# Patient Record
Sex: FEMALE | Race: ASIAN | NOT HISPANIC OR LATINO
[De-identification: names, ages, dates, MRNs, and addresses within clinical notes are randomized per-mention and may not be internally consistent; named-entity substitution may affect disease eponyms.]

---

## 2021-09-23 ENCOUNTER — APPOINTMENT (OUTPATIENT)
Dept: CARDIOLOGY | Facility: CLINIC | Age: 76
End: 2021-09-23
Payer: MEDICARE

## 2021-09-23 VITALS
DIASTOLIC BLOOD PRESSURE: 78 MMHG | RESPIRATION RATE: 18 BRPM | OXYGEN SATURATION: 98 % | SYSTOLIC BLOOD PRESSURE: 164 MMHG | HEIGHT: 62 IN | TEMPERATURE: 97.6 F | HEART RATE: 84 BPM | BODY MASS INDEX: 33.13 KG/M2 | WEIGHT: 180 LBS

## 2021-09-23 DIAGNOSIS — I10 ESSENTIAL (PRIMARY) HYPERTENSION: ICD-10-CM

## 2021-09-23 DIAGNOSIS — R07.9 CHEST PAIN, UNSPECIFIED: ICD-10-CM

## 2021-09-23 DIAGNOSIS — E11.9 TYPE 2 DIABETES MELLITUS W/OUT COMPLICATIONS: ICD-10-CM

## 2021-09-23 DIAGNOSIS — E78.5 HYPERLIPIDEMIA, UNSPECIFIED: ICD-10-CM

## 2021-09-23 PROBLEM — Z00.00 ENCOUNTER FOR PREVENTIVE HEALTH EXAMINATION: Status: ACTIVE | Noted: 2021-09-23

## 2021-09-23 PROCEDURE — 93306 TTE W/DOPPLER COMPLETE: CPT

## 2021-09-23 PROCEDURE — 93000 ELECTROCARDIOGRAM COMPLETE: CPT

## 2021-09-23 PROCEDURE — 99205 OFFICE O/P NEW HI 60 MIN: CPT

## 2021-09-23 RX ORDER — GABAPENTIN 600 MG/1
600 TABLET, COATED ORAL
Refills: 0 | Status: ACTIVE | COMMUNITY
Start: 2021-09-23

## 2021-09-23 RX ORDER — SIMVASTATIN 20 MG/1
20 TABLET, FILM COATED ORAL
Refills: 0 | Status: ACTIVE | COMMUNITY
Start: 2021-09-23

## 2021-09-23 RX ORDER — GLIMEPIRIDE 4 MG/1
4 TABLET ORAL
Refills: 0 | Status: ACTIVE | COMMUNITY
Start: 2021-09-23

## 2021-09-23 RX ORDER — DONEPEZIL HYDROCHLORIDE 5 MG/1
5 TABLET, FILM COATED ORAL
Refills: 0 | Status: ACTIVE | COMMUNITY
Start: 2021-09-23

## 2021-09-23 RX ORDER — ISOSORBIDE MONONITRATE 60 MG/1
60 TABLET, EXTENDED RELEASE ORAL
Refills: 0 | Status: ACTIVE | COMMUNITY
Start: 2021-09-23

## 2021-09-23 RX ORDER — ICOSAPENT ETHYL 1000 MG/1
1 CAPSULE ORAL
Refills: 0 | Status: ACTIVE | COMMUNITY
Start: 2021-09-23

## 2021-09-23 RX ORDER — LOSARTAN POTASSIUM AND HYDROCHLOROTHIAZIDE 12.5; 1 MG/1; MG/1
100-12.5 TABLET ORAL
Refills: 0 | Status: ACTIVE | COMMUNITY
Start: 2021-09-23

## 2021-09-23 RX ORDER — ASPIRIN 81 MG/1
81 TABLET ORAL
Refills: 0 | Status: ACTIVE | COMMUNITY
Start: 2021-09-23

## 2021-09-23 NOTE — ASSESSMENT
[FreeTextEntry1] : 76 F HTN DM hyperlipidemia with CP, SOB and abnormal EKG.\par The patient has symptoms consistent with progressive angina and has new changes on EKG.\par REFER FOR CARDIAC CATHETERIZATION.\par Continue ASA and statin.\par NG 0.4mg prn for CP given. Patient instructed to call 911 if CP occurs again and is not relieved with NG.\par Condition deteriorating, see me after test.

## 2021-09-23 NOTE — REASON FOR VISIT
[Hyperlipidemia] : hyperlipidemia [Hypertension] : hypertension [FreeTextEntry1] : 76 F HTN hyperlipidemia DM with CP and SOB.

## 2021-09-23 NOTE — HISTORY OF PRESENT ILLNESS
[FreeTextEntry1] : 1. HTN: on medications.\par 2. Hyperlipidemia: on statin.\par 3. DM: on glimepiride and levemir.\par 4. The patient has noted severe exertional CP over the last few months. Her symptoms are exertional, progressive, lasting minutes and associated with dizziness.\par Her symptoms are severe and increasing in severity and duration. Her CP is pressure-like lasting minutes and relieved with rest and radiates to her jaw. She also has limited ET due to severe SOB with exertion. Her symptoms occur several times/ day.

## 2021-09-23 NOTE — PHYSICAL EXAM
[Well Developed] : well developed [Well Nourished] : well nourished [No Acute Distress] : no acute distress [Normal Conjunctiva] : normal conjunctiva [Normal Venous Pressure] : normal venous pressure [No Carotid Bruit] : no carotid bruit [Normal S1, S2] : normal S1, S2 [No Rub] : no rub [No Gallop] : no gallop [Clear Lung Fields] : clear lung fields [Good Air Entry] : good air entry [No Respiratory Distress] : no respiratory distress  [Soft] : abdomen soft [No Masses/organomegaly] : no masses/organomegaly [Non Tender] : non-tender [Normal Bowel Sounds] : normal bowel sounds [Normal Gait] : normal gait [No Cyanosis] : no cyanosis [No Varicosities] : no varicosities [No Clubbing] : no clubbing [No Rash] : no rash [No Skin Lesions] : no skin lesions [Moves all extremities] : moves all extremities [No Focal Deficits] : no focal deficits [Normal Speech] : normal speech [Alert and Oriented] : alert and oriented [Normal memory] : normal memory [de-identified] : 2/6 JAMIE MCCAULEY  [de-identified] : 1+ bilateral leg edema

## 2021-09-28 ENCOUNTER — APPOINTMENT (OUTPATIENT)
Dept: DISASTER EMERGENCY | Facility: CLINIC | Age: 76
End: 2021-09-28

## 2021-09-28 DIAGNOSIS — Z01.818 ENCOUNTER FOR OTHER PREPROCEDURAL EXAMINATION: ICD-10-CM

## 2021-09-29 LAB — SARS-COV-2 N GENE NPH QL NAA+PROBE: NOT DETECTED

## 2021-09-29 RX ORDER — CHLORHEXIDINE GLUCONATE 213 G/1000ML
1 SOLUTION TOPICAL ONCE
Refills: 0 | Status: DISCONTINUED | OUTPATIENT
Start: 2021-10-01 | End: 2021-10-02

## 2021-09-29 NOTE — H&P ADULT - NSHPLABSRESULTS_GEN_ALL_CORE
13.2   4.51  )-----------( 185      ( 01 Oct 2021 11:26 )             41.2                             EKG: NSR; TWI V2-V6

## 2021-09-29 NOTE — H&P ADULT - ATTENDING COMMENTS
Please see PA note for full details.  I have reviewed the case, examined the patient and agree with plan.  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  Patient underwent PCI and will f/u with me in clinic.

## 2021-09-29 NOTE — H&P ADULT - HISTORY OF PRESENT ILLNESS
[facility]   COVID: 9/28/21 Negative (HIE)   Cardiologist: Dr Troy  Pharmacy:   Escort:    76 Y F Polish speaking  with pmh HTN, HLD, DM II who presented to her cardiologist c/o ___ progressively worsening chest pressure with radiation to jaw and associated and SOB with exertion of ____ blocks/flights occurring for ____________.   Pt denies______  ECHO 9/23/21: EF 65-70%, mild DD, mild MR.   In light of risk factors, CCS angina class ____ symptoms, pt recommended for cardiac angiogram with possible intervention if clinically indicated.    [facility]   COVID: 9/28/21 Negative (HIE)   Cardiologist: Dr Troy  Pharmacy:   Escort:    76 Y F Luxembourgish speaking  with pmh HTN, HLD, DM II who presented to her cardiologist c/o ___ progressively worsening chest pressure with radiation to jaw and associated and SOB with exertion of ____ blocks/flights occurring for ____________.   Pt denies______  ECHO 9/23/21: EF 65-70%, mild DD, mild MR.   In light of risk factors, CCS angina class ____ symptoms, pt recommended for cardiac angiogram with possible intervention if clinically indicated.    [facility]   COVID: 9/28/21 Negative (HIE)   Cardiologist: Dr Troy  Pharmacy:   Escort:    76 Y F Danish speaking  with pmh HTN, HLD, DM II who presented to her cardiologist c/o ___ progressively worsening chest pressure with radiation to jaw and associated and SOB with exertion of ____ blocks/flights occurring for ____________.   Pt denies______  ECHO 9/23/21: EF 65-70%, mild DD, mild MR.   In light of risk factors, CCS angina class ____ symptoms, pt recommended for cardiac angiogram with possible intervention if clinically indicated.    COVID: 9/28/21 Negative (HIE)   Cardiologist: Dr Troy  Pharmacy: [facility] [address] [place] [address]  Escort: sibling   76 Y F Kyrgyz speaking  POOR HISTORIAN former smoker with pmh colon cancer (s/p chemo/radiation treatment 15 yrs ago), asthma (no hosp/intubation), HTN, HLD, DM II who presented to her cardiologist c/o progressively worsening L sided chest pressure 6/10 with radiation to jaw and associated nausea, dizziness, diaphoresis, fatigue  palpitations, and SOB with exertion of 1 block occurring for 3 months.  Symptoms resolve with rest.  Pt denies orthopnea, PND, LE edema, V/D, fever, chills, sick contact, or recent travel.  ECHO 9/23/21: EF 65-70%, mild diastolic dysfunction, mild MR.  In light of risk factors, CCS angina class III symptoms, pt recommended for cardiac angiogram with possible intervention if clinically indicated.    COVID: 9/28/21 Negative (HIE)   Cardiologist: Dr Troy  Pharmacy: [facility] [address] [place] [address]  Escort: sibling   76 Y F Lao speaking  POOR HISTORIAN former smoker with pmh colon cancer (s/p chemo/radiation treatment 15 yrs ago), asthma (no hosp/intubation), HTN, HLD, DM II who presented to her cardiologist c/o progressively worsening L sided chest pressure 6/10 with radiation to jaw and associated nausea, dizziness, diaphoresis, fatigue  palpitations, and SOB with exertion of 1 block occurring for 3 months.  Symptoms resolve with rest.  Pt denies orthopnea, PND, LE edema, V/D, fever, chills, sick contact, or recent travel.  ECHO 9/23/21: EF 65-70%, mild diastolic dysfunction, mild MR.  In light of risk factors, CCS angina class III symptoms, pt recommended for cardiac angiogram with possible intervention if clinically indicated.    COVID: 9/28/21 Negative (HIE)   Cardiologist: Dr Troy  Pharmacy: [facility] [address] [place] [address]  Escort: sibling   76 Y F Albanian speaking  POOR HISTORIAN former smoker with pmh colon cancer (s/p chemo/radiation treatment 15 yrs ago), asthma (no hosp/intubation), HTN, HLD, DM II who presented to her cardiologist c/o progressively worsening L sided chest pressure 6/10 with radiation to jaw and associated nausea, dizziness, diaphoresis, fatigue  palpitations, and SOB with exertion of 1 block occurring for 3 months.  Symptoms resolve with rest.  Pt denies orthopnea, PND, LE edema, V/D, fever, chills, sick contact, or recent travel.  ECHO 9/23/21: EF 65-70%, mild diastolic dysfunction, mild MR.  In light of risk factors, CCS angina class III symptoms, pt recommended for cardiac angiogram with possible intervention if clinically indicated.    COVID: 9/28/21 Negative (HIE)   Cardiologist: Dr Troy  Pharmacy: [facility] [address] [place] [address]  Escort: sibling   76 Y F Welsh speaking  POOR HISTORIAN former smoker with pmh colon cancer (s/p chemo/radiation treatment 15 yrs ago), asthma (no hosp/intubation), HTN, HLD, DM II who presented to her cardiologist c/o progressively worsening L sided chest pressure 6/10 with radiation to jaw and associated nausea, dizziness, diaphoresis, fatigue  palpitations, and SOB with exertion of 1 block occurring for 3 months.  Symptoms resolve with rest.  Pt denies orthopnea, PND, LE edema, V/D, fever, chills, sick contact, or recent travel.  ECHO 9/23/21: EF 65-70%, mild diastolic dysfunction, mild MR.  In light of risk factors, CCS angina class III symptoms, pt recommended for cardiac angiogram with possible intervention if clinically indicated.    COVID: 9/28/21 Negative (HIE)   Cardiologist: Dr Troy  Pharmacy: [facility] [address] [place] [address]  Escort: sibling   76 Y F Ukrainian speaking  POOR HISTORIAN former smoker with pmh colon cancer (s/p chemo/radiation treatment 15 yrs ago), asthma (no hosp/intubation), HTN, HLD, DM II who presented to her cardiologist c/o progressively worsening L sided chest pressure 6/10 with radiation to jaw and associated nausea, dizziness, diaphoresis, fatigue  palpitations, and SOB with exertion of 1 block occurring for 3 months.  Symptoms resolve with rest.  Pt denies orthopnea, PND, LE edema, V/D, fever, chills, sick contact, or recent travel.  ECHO 9/23/21: EF 65-70%, mild diastolic dysfunction, mild MR.  In light of risk factors, CCS angina class III symptoms, pt recommended for cardiac angiogram with possible intervention if clinically indicated.    COVID: 9/28/21 Negative (HIE)   Cardiologist: Dr Troy  Pharmacy: [facility] [address] [place] [address]  Escort: sibling   76 Y F Turkish speaking  POOR HISTORIAN former smoker with pmh colon cancer (s/p chemo/radiation treatment 15 yrs ago), asthma (no hosp/intubation), HTN, HLD, DM II who presented to her cardiologist c/o progressively worsening L sided chest pressure 6/10 with radiation to jaw and associated nausea, dizziness, diaphoresis, fatigue  palpitations, and SOB with exertion of 1 block occurring for 3 months.  Symptoms resolve with rest.  Pt denies orthopnea, PND, LE edema, V/D, fever, chills, sick contact, or recent travel.  ECHO 9/23/21: EF 65-70%, mild diastolic dysfunction, mild MR.  In light of risk factors, CCS angina class III symptoms, pt recommended for cardiac angiogram with possible intervention if clinically indicated.

## 2021-09-29 NOTE — H&P ADULT - ASSESSMENT
76 Y F Lithuanian speaking  POOR HISTORIAN former smoker with pmh colon cancer (s/p chemo/radiation treatment 15 yrs ago), asthma (no hosp/intubation), HTN, HLD, DM II who presented to her cardiologist c/o progressively worsening L sided chest pressure 6/10 with radiation to jaw and associated nausea, dizziness, diaphoresis, fatigue  palpitations, and SOB with exertion of 1 block occurring for 3 months.  Symptoms resolve with rest.  Pt denies orthopnea, PND, LE edema, V/D, fever, chills, sick contact, or recent travel.  ECHO 9/23/21: EF 65-70%, mild diastolic dysfunction, mild MR.  In light of risk factors, CCS angina class III symptoms, pt recommended for cardiac angiogram with possible intervention if clinically indicated.     --ASA III; Mallampati III.   --VSS.   --Pt is a candidate for moderate sedation.   --LOAD: ASA 325mg PO x1, Plavix 600mg PO x1.   --FLUIDS: NS 75cc/hr x4hrs.     Risks & benefits of procedure and alternative therapy have been explained to the patient including but not limited to: allergic reaction, bleeding w/possible need for blood transfusion, infection, renal and vascular compromise, limb damage, arrhythmia, stroke, vessel dissection/perforation, Myocardial infarction, emergent CABG. Informed consent obtained and in chart.  76 Y F Lao speaking  POOR HISTORIAN former smoker with pmh colon cancer (s/p chemo/radiation treatment 15 yrs ago), asthma (no hosp/intubation), HTN, HLD, DM II who presented to her cardiologist c/o progressively worsening L sided chest pressure 6/10 with radiation to jaw and associated nausea, dizziness, diaphoresis, fatigue  palpitations, and SOB with exertion of 1 block occurring for 3 months.  Symptoms resolve with rest.  Pt denies orthopnea, PND, LE edema, V/D, fever, chills, sick contact, or recent travel.  ECHO 9/23/21: EF 65-70%, mild diastolic dysfunction, mild MR.  In light of risk factors, CCS angina class III symptoms, pt recommended for cardiac angiogram with possible intervention if clinically indicated.     --ASA III; Mallampati III.   --VSS.   --Pt is a candidate for moderate sedation.   --LOAD: ASA 325mg PO x1, Plavix 600mg PO x1.   --FLUIDS: NS 75cc/hr x4hrs.     Risks & benefits of procedure and alternative therapy have been explained to the patient including but not limited to: allergic reaction, bleeding w/possible need for blood transfusion, infection, renal and vascular compromise, limb damage, arrhythmia, stroke, vessel dissection/perforation, Myocardial infarction, emergent CABG. Informed consent obtained and in chart.  76 Y F Occitan speaking  POOR HISTORIAN former smoker with pmh colon cancer (s/p chemo/radiation treatment 15 yrs ago), asthma (no hosp/intubation), HTN, HLD, DM II who presented to her cardiologist c/o progressively worsening L sided chest pressure 6/10 with radiation to jaw and associated nausea, dizziness, diaphoresis, fatigue  palpitations, and SOB with exertion of 1 block occurring for 3 months.  Symptoms resolve with rest.  Pt denies orthopnea, PND, LE edema, V/D, fever, chills, sick contact, or recent travel.  ECHO 9/23/21: EF 65-70%, mild diastolic dysfunction, mild MR.  In light of risk factors, CCS angina class III symptoms, pt recommended for cardiac angiogram with possible intervention if clinically indicated.     --ASA III; Mallampati III.   --VSS.   --Pt is a candidate for moderate sedation.   --LOAD: ASA 325mg PO x1, Plavix 600mg PO x1.   --FLUIDS: NS 75cc/hr x4hrs.     Risks & benefits of procedure and alternative therapy have been explained to the patient including but not limited to: allergic reaction, bleeding w/possible need for blood transfusion, infection, renal and vascular compromise, limb damage, arrhythmia, stroke, vessel dissection/perforation, Myocardial infarction, emergent CABG. Informed consent obtained and in chart.

## 2021-09-29 NOTE — H&P ADULT - NSICDXPASTMEDICALHX_GEN_ALL_CORE_FT
PAST MEDICAL HISTORY:  HLD (hyperlipidemia)     HTN (hypertension)     Type 2 diabetes mellitus      PAST MEDICAL HISTORY:  Colon cancer s/p chemo/radiation 15 yrs ago    HLD (hyperlipidemia)     HTN (hypertension)     Type 2 diabetes mellitus

## 2021-09-29 NOTE — H&P ADULT - NSHPSOCIALHISTORY_GEN_ALL_CORE
Tobacco:  ETOH:  Drug use: Tobacco: former, quit 20 yrs ago, 3 ppd x 15 yrs  ETOH: denies   Drug use: denies
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
## 2021-10-01 ENCOUNTER — TRANSCRIPTION ENCOUNTER (OUTPATIENT)
Age: 76
End: 2021-10-01

## 2021-10-01 ENCOUNTER — INPATIENT (INPATIENT)
Facility: HOSPITAL | Age: 76
LOS: 0 days | Discharge: HOME | DRG: 246 | End: 2021-10-02
Attending: INTERNAL MEDICINE | Admitting: INTERNAL MEDICINE
Payer: MEDICARE

## 2021-10-01 VITALS
HEART RATE: 66 BPM | OXYGEN SATURATION: 98 % | DIASTOLIC BLOOD PRESSURE: 67 MMHG | RESPIRATION RATE: 18 BRPM | HEIGHT: 63 IN | WEIGHT: 179.9 LBS | SYSTOLIC BLOOD PRESSURE: 185 MMHG

## 2021-10-01 DIAGNOSIS — Z98.890 OTHER SPECIFIED POSTPROCEDURAL STATES: Chronic | ICD-10-CM

## 2021-10-01 DIAGNOSIS — Z98.49 CATARACT EXTRACTION STATUS, UNSPECIFIED EYE: Chronic | ICD-10-CM

## 2021-10-01 LAB
A1C WITH ESTIMATED AVERAGE GLUCOSE RESULT: 9.4 % — HIGH (ref 4–5.6)
ALBUMIN SERPL ELPH-MCNC: 4.5 G/DL — SIGNIFICANT CHANGE UP (ref 3.3–5)
ALP SERPL-CCNC: 60 U/L — SIGNIFICANT CHANGE UP (ref 40–120)
ALT FLD-CCNC: 18 U/L — SIGNIFICANT CHANGE UP (ref 10–45)
ANION GAP SERPL CALC-SCNC: 9 MMOL/L — SIGNIFICANT CHANGE UP (ref 5–17)
APTT BLD: 30.7 SEC — SIGNIFICANT CHANGE UP (ref 27.5–35.5)
AST SERPL-CCNC: 19 U/L — SIGNIFICANT CHANGE UP (ref 10–40)
BASOPHILS # BLD AUTO: 0.04 K/UL — SIGNIFICANT CHANGE UP (ref 0–0.2)
BASOPHILS NFR BLD AUTO: 0.9 % — SIGNIFICANT CHANGE UP (ref 0–2)
BILIRUB SERPL-MCNC: 0.8 MG/DL — SIGNIFICANT CHANGE UP (ref 0.2–1.2)
BUN SERPL-MCNC: 18 MG/DL — SIGNIFICANT CHANGE UP (ref 7–23)
CALCIUM SERPL-MCNC: 9.7 MG/DL — SIGNIFICANT CHANGE UP (ref 8.4–10.5)
CHLORIDE SERPL-SCNC: 103 MMOL/L — SIGNIFICANT CHANGE UP (ref 96–108)
CHOLEST SERPL-MCNC: 191 MG/DL — SIGNIFICANT CHANGE UP
CK MB CFR SERPL CALC: 7.7 NG/ML — HIGH (ref 0–6.7)
CK SERPL-CCNC: 247 U/L — HIGH (ref 25–170)
CO2 SERPL-SCNC: 27 MMOL/L — SIGNIFICANT CHANGE UP (ref 22–31)
CREAT SERPL-MCNC: 0.71 MG/DL — SIGNIFICANT CHANGE UP (ref 0.5–1.3)
EOSINOPHIL # BLD AUTO: 0.07 K/UL — SIGNIFICANT CHANGE UP (ref 0–0.5)
EOSINOPHIL NFR BLD AUTO: 1.6 % — SIGNIFICANT CHANGE UP (ref 0–6)
ESTIMATED AVERAGE GLUCOSE: 223 MG/DL — HIGH (ref 68–114)
GLUCOSE BLDC GLUCOMTR-MCNC: 110 MG/DL — HIGH (ref 70–99)
GLUCOSE BLDC GLUCOMTR-MCNC: 165 MG/DL — HIGH (ref 70–99)
GLUCOSE SERPL-MCNC: 170 MG/DL — HIGH (ref 70–99)
HCT VFR BLD CALC: 41.2 % — SIGNIFICANT CHANGE UP (ref 34.5–45)
HDLC SERPL-MCNC: 59 MG/DL — SIGNIFICANT CHANGE UP
HGB BLD-MCNC: 13.2 G/DL — SIGNIFICANT CHANGE UP (ref 11.5–15.5)
IMM GRANULOCYTES NFR BLD AUTO: 0.2 % — SIGNIFICANT CHANGE UP (ref 0–1.5)
INR BLD: 0.91 — SIGNIFICANT CHANGE UP (ref 0.88–1.16)
LIPID PNL WITH DIRECT LDL SERPL: 101 MG/DL — HIGH
LYMPHOCYTES # BLD AUTO: 1.71 K/UL — SIGNIFICANT CHANGE UP (ref 1–3.3)
LYMPHOCYTES # BLD AUTO: 37.9 % — SIGNIFICANT CHANGE UP (ref 13–44)
MCHC RBC-ENTMCNC: 27.7 PG — SIGNIFICANT CHANGE UP (ref 27–34)
MCHC RBC-ENTMCNC: 32 GM/DL — SIGNIFICANT CHANGE UP (ref 32–36)
MCV RBC AUTO: 86.6 FL — SIGNIFICANT CHANGE UP (ref 80–100)
MONOCYTES # BLD AUTO: 0.3 K/UL — SIGNIFICANT CHANGE UP (ref 0–0.9)
MONOCYTES NFR BLD AUTO: 6.7 % — SIGNIFICANT CHANGE UP (ref 2–14)
NEUTROPHILS # BLD AUTO: 2.38 K/UL — SIGNIFICANT CHANGE UP (ref 1.8–7.4)
NEUTROPHILS NFR BLD AUTO: 52.7 % — SIGNIFICANT CHANGE UP (ref 43–77)
NON HDL CHOLESTEROL: 132 MG/DL — HIGH
NRBC # BLD: 0 /100 WBCS — SIGNIFICANT CHANGE UP (ref 0–0)
PLATELET # BLD AUTO: 185 K/UL — SIGNIFICANT CHANGE UP (ref 150–400)
POTASSIUM SERPL-MCNC: 3.8 MMOL/L — SIGNIFICANT CHANGE UP (ref 3.5–5.3)
POTASSIUM SERPL-SCNC: 3.8 MMOL/L — SIGNIFICANT CHANGE UP (ref 3.5–5.3)
PROT SERPL-MCNC: 7.1 G/DL — SIGNIFICANT CHANGE UP (ref 6–8.3)
PROTHROM AB SERPL-ACNC: 11 SEC — SIGNIFICANT CHANGE UP (ref 10.6–13.6)
RBC # BLD: 4.76 M/UL — SIGNIFICANT CHANGE UP (ref 3.8–5.2)
RBC # FLD: 13.3 % — SIGNIFICANT CHANGE UP (ref 10.3–14.5)
SODIUM SERPL-SCNC: 139 MMOL/L — SIGNIFICANT CHANGE UP (ref 135–145)
TRIGL SERPL-MCNC: 154 MG/DL — HIGH
WBC # BLD: 4.51 K/UL — SIGNIFICANT CHANGE UP (ref 3.8–10.5)
WBC # FLD AUTO: 4.51 K/UL — SIGNIFICANT CHANGE UP (ref 3.8–10.5)

## 2021-10-01 PROCEDURE — 92933 PRQ TRLML C ATHRC ST ANGIOP1: CPT | Mod: LD

## 2021-10-01 PROCEDURE — 99222 1ST HOSP IP/OBS MODERATE 55: CPT

## 2021-10-01 PROCEDURE — 93458 L HRT ARTERY/VENTRICLE ANGIO: CPT | Mod: 26,59

## 2021-10-01 PROCEDURE — 99152 MOD SED SAME PHYS/QHP 5/>YRS: CPT

## 2021-10-01 PROCEDURE — 92928 PRQ TCAT PLMT NTRAC ST 1 LES: CPT | Mod: LC

## 2021-10-01 RX ORDER — INSULIN LISPRO 100/ML
3 VIAL (ML) SUBCUTANEOUS
Refills: 0 | Status: DISCONTINUED | OUTPATIENT
Start: 2021-10-01 | End: 2021-10-02

## 2021-10-01 RX ORDER — DONEPEZIL HYDROCHLORIDE 10 MG/1
5 TABLET, FILM COATED ORAL AT BEDTIME
Refills: 0 | Status: DISCONTINUED | OUTPATIENT
Start: 2021-10-01 | End: 2021-10-02

## 2021-10-01 RX ORDER — SODIUM CHLORIDE 9 MG/ML
500 INJECTION INTRAMUSCULAR; INTRAVENOUS; SUBCUTANEOUS
Refills: 0 | Status: DISCONTINUED | OUTPATIENT
Start: 2021-10-01 | End: 2021-10-02

## 2021-10-01 RX ORDER — INSULIN LISPRO 100/ML
VIAL (ML) SUBCUTANEOUS
Refills: 0 | Status: DISCONTINUED | OUTPATIENT
Start: 2021-10-01 | End: 2021-10-02

## 2021-10-01 RX ORDER — DEXTROSE 50 % IN WATER 50 %
25 SYRINGE (ML) INTRAVENOUS ONCE
Refills: 0 | Status: DISCONTINUED | OUTPATIENT
Start: 2021-10-01 | End: 2021-10-02

## 2021-10-01 RX ORDER — SODIUM CHLORIDE 9 MG/ML
1000 INJECTION, SOLUTION INTRAVENOUS
Refills: 0 | Status: DISCONTINUED | OUTPATIENT
Start: 2021-10-01 | End: 2021-10-02

## 2021-10-01 RX ORDER — CLOPIDOGREL BISULFATE 75 MG/1
600 TABLET, FILM COATED ORAL ONCE
Refills: 0 | Status: COMPLETED | OUTPATIENT
Start: 2021-10-01 | End: 2021-10-01

## 2021-10-01 RX ORDER — GABAPENTIN 400 MG/1
600 CAPSULE ORAL
Refills: 0 | Status: DISCONTINUED | OUTPATIENT
Start: 2021-10-01 | End: 2021-10-02

## 2021-10-01 RX ORDER — DEXTROSE 50 % IN WATER 50 %
12.5 SYRINGE (ML) INTRAVENOUS ONCE
Refills: 0 | Status: DISCONTINUED | OUTPATIENT
Start: 2021-10-01 | End: 2021-10-02

## 2021-10-01 RX ORDER — ASPIRIN/CALCIUM CARB/MAGNESIUM 324 MG
325 TABLET ORAL ONCE
Refills: 0 | Status: COMPLETED | OUTPATIENT
Start: 2021-10-01 | End: 2021-10-01

## 2021-10-01 RX ORDER — INSULIN GLARGINE 100 [IU]/ML
32 INJECTION, SOLUTION SUBCUTANEOUS AT BEDTIME
Refills: 0 | Status: DISCONTINUED | OUTPATIENT
Start: 2021-10-01 | End: 2021-10-02

## 2021-10-01 RX ORDER — ISOSORBIDE MONONITRATE 60 MG/1
60 TABLET, EXTENDED RELEASE ORAL ONCE
Refills: 0 | Status: COMPLETED | OUTPATIENT
Start: 2021-10-01 | End: 2021-10-01

## 2021-10-01 RX ORDER — SIMVASTATIN 20 MG/1
20 TABLET, FILM COATED ORAL AT BEDTIME
Refills: 0 | Status: DISCONTINUED | OUTPATIENT
Start: 2021-10-01 | End: 2021-10-02

## 2021-10-01 RX ORDER — CLOPIDOGREL BISULFATE 75 MG/1
75 TABLET, FILM COATED ORAL DAILY
Refills: 0 | Status: DISCONTINUED | OUTPATIENT
Start: 2021-10-02 | End: 2021-10-02

## 2021-10-01 RX ORDER — DEXTROSE 50 % IN WATER 50 %
15 SYRINGE (ML) INTRAVENOUS ONCE
Refills: 0 | Status: DISCONTINUED | OUTPATIENT
Start: 2021-10-01 | End: 2021-10-02

## 2021-10-01 RX ORDER — PANTOPRAZOLE SODIUM 20 MG/1
40 TABLET, DELAYED RELEASE ORAL
Refills: 0 | Status: DISCONTINUED | OUTPATIENT
Start: 2021-10-01 | End: 2021-10-02

## 2021-10-01 RX ORDER — ACETAMINOPHEN 500 MG
650 TABLET ORAL ONCE
Refills: 0 | Status: COMPLETED | OUTPATIENT
Start: 2021-10-01 | End: 2021-10-01

## 2021-10-01 RX ORDER — ISOSORBIDE MONONITRATE 60 MG/1
30 TABLET, EXTENDED RELEASE ORAL DAILY
Refills: 0 | Status: DISCONTINUED | OUTPATIENT
Start: 2021-10-01 | End: 2021-10-02

## 2021-10-01 RX ORDER — GLUCAGON INJECTION, SOLUTION 0.5 MG/.1ML
1 INJECTION, SOLUTION SUBCUTANEOUS ONCE
Refills: 0 | Status: DISCONTINUED | OUTPATIENT
Start: 2021-10-01 | End: 2021-10-02

## 2021-10-01 RX ORDER — SODIUM CHLORIDE 9 MG/ML
500 INJECTION INTRAMUSCULAR; INTRAVENOUS; SUBCUTANEOUS
Refills: 0 | Status: DISCONTINUED | OUTPATIENT
Start: 2021-10-01 | End: 2021-10-01

## 2021-10-01 RX ORDER — ASPIRIN/CALCIUM CARB/MAGNESIUM 324 MG
81 TABLET ORAL DAILY
Refills: 0 | Status: DISCONTINUED | OUTPATIENT
Start: 2021-10-02 | End: 2021-10-02

## 2021-10-01 RX ORDER — LOSARTAN POTASSIUM 100 MG/1
100 TABLET, FILM COATED ORAL DAILY
Refills: 0 | Status: DISCONTINUED | OUTPATIENT
Start: 2021-10-02 | End: 2021-10-02

## 2021-10-01 RX ADMIN — Medication 325 MILLIGRAM(S): at 12:13

## 2021-10-01 RX ADMIN — ISOSORBIDE MONONITRATE 60 MILLIGRAM(S): 60 TABLET, EXTENDED RELEASE ORAL at 12:36

## 2021-10-01 RX ADMIN — Medication 2: at 16:49

## 2021-10-01 RX ADMIN — CLOPIDOGREL BISULFATE 600 MILLIGRAM(S): 75 TABLET, FILM COATED ORAL at 12:12

## 2021-10-01 RX ADMIN — SIMVASTATIN 20 MILLIGRAM(S): 20 TABLET, FILM COATED ORAL at 22:17

## 2021-10-01 RX ADMIN — INSULIN GLARGINE 32 UNIT(S): 100 INJECTION, SOLUTION SUBCUTANEOUS at 22:16

## 2021-10-01 RX ADMIN — GABAPENTIN 600 MILLIGRAM(S): 400 CAPSULE ORAL at 18:15

## 2021-10-01 RX ADMIN — Medication 3 UNIT(S): at 16:49

## 2021-10-01 RX ADMIN — Medication 650 MILLIGRAM(S): at 23:16

## 2021-10-01 RX ADMIN — SODIUM CHLORIDE 75 MILLILITER(S): 9 INJECTION INTRAMUSCULAR; INTRAVENOUS; SUBCUTANEOUS at 12:12

## 2021-10-01 RX ADMIN — ISOSORBIDE MONONITRATE 30 MILLIGRAM(S): 60 TABLET, EXTENDED RELEASE ORAL at 16:21

## 2021-10-01 RX ADMIN — DONEPEZIL HYDROCHLORIDE 5 MILLIGRAM(S): 10 TABLET, FILM COATED ORAL at 22:17

## 2021-10-01 RX ADMIN — Medication 650 MILLIGRAM(S): at 22:16

## 2021-10-02 ENCOUNTER — TRANSCRIPTION ENCOUNTER (OUTPATIENT)
Age: 76
End: 2021-10-02

## 2021-10-02 VITALS
OXYGEN SATURATION: 97 % | SYSTOLIC BLOOD PRESSURE: 145 MMHG | DIASTOLIC BLOOD PRESSURE: 65 MMHG | RESPIRATION RATE: 18 BRPM | HEART RATE: 69 BPM

## 2021-10-02 LAB
ANION GAP SERPL CALC-SCNC: 7 MMOL/L — SIGNIFICANT CHANGE UP (ref 5–17)
BUN SERPL-MCNC: 18 MG/DL — SIGNIFICANT CHANGE UP (ref 7–23)
CALCIUM SERPL-MCNC: 9.4 MG/DL — SIGNIFICANT CHANGE UP (ref 8.4–10.5)
CHLORIDE SERPL-SCNC: 107 MMOL/L — SIGNIFICANT CHANGE UP (ref 96–108)
CO2 SERPL-SCNC: 28 MMOL/L — SIGNIFICANT CHANGE UP (ref 22–31)
COVID-19 SPIKE DOMAIN AB INTERP: POSITIVE
COVID-19 SPIKE DOMAIN ANTIBODY RESULT: 140 U/ML — HIGH
CREAT SERPL-MCNC: 0.79 MG/DL — SIGNIFICANT CHANGE UP (ref 0.5–1.3)
GLUCOSE BLDC GLUCOMTR-MCNC: 142 MG/DL — HIGH (ref 70–99)
GLUCOSE BLDC GLUCOMTR-MCNC: 206 MG/DL — HIGH (ref 70–99)
GLUCOSE SERPL-MCNC: 153 MG/DL — HIGH (ref 70–99)
HCT VFR BLD CALC: 38.1 % — SIGNIFICANT CHANGE UP (ref 34.5–45)
HCV AB S/CO SERPL IA: 0.04 S/CO — SIGNIFICANT CHANGE UP
HCV AB SERPL-IMP: SIGNIFICANT CHANGE UP
HGB BLD-MCNC: 12.5 G/DL — SIGNIFICANT CHANGE UP (ref 11.5–15.5)
MAGNESIUM SERPL-MCNC: 1.9 MG/DL — SIGNIFICANT CHANGE UP (ref 1.6–2.6)
MCHC RBC-ENTMCNC: 28.5 PG — SIGNIFICANT CHANGE UP (ref 27–34)
MCHC RBC-ENTMCNC: 32.8 GM/DL — SIGNIFICANT CHANGE UP (ref 32–36)
MCV RBC AUTO: 87 FL — SIGNIFICANT CHANGE UP (ref 80–100)
NRBC # BLD: 0 /100 WBCS — SIGNIFICANT CHANGE UP (ref 0–0)
PLATELET # BLD AUTO: 187 K/UL — SIGNIFICANT CHANGE UP (ref 150–400)
POTASSIUM SERPL-MCNC: 3.8 MMOL/L — SIGNIFICANT CHANGE UP (ref 3.5–5.3)
POTASSIUM SERPL-SCNC: 3.8 MMOL/L — SIGNIFICANT CHANGE UP (ref 3.5–5.3)
RBC # BLD: 4.38 M/UL — SIGNIFICANT CHANGE UP (ref 3.8–5.2)
RBC # FLD: 13.5 % — SIGNIFICANT CHANGE UP (ref 10.3–14.5)
SARS-COV-2 IGG+IGM SERPL QL IA: 140 U/ML — HIGH
SARS-COV-2 IGG+IGM SERPL QL IA: POSITIVE
SODIUM SERPL-SCNC: 142 MMOL/L — SIGNIFICANT CHANGE UP (ref 135–145)
WBC # BLD: 4.6 K/UL — SIGNIFICANT CHANGE UP (ref 3.8–10.5)
WBC # FLD AUTO: 4.6 K/UL — SIGNIFICANT CHANGE UP (ref 3.8–10.5)

## 2021-10-02 PROCEDURE — 99239 HOSP IP/OBS DSCHRG MGMT >30: CPT

## 2021-10-02 RX ORDER — MAGNESIUM OXIDE 400 MG ORAL TABLET 241.3 MG
400 TABLET ORAL ONCE
Refills: 0 | Status: COMPLETED | OUTPATIENT
Start: 2021-10-02 | End: 2021-10-02

## 2021-10-02 RX ORDER — ASPIRIN/CALCIUM CARB/MAGNESIUM 324 MG
1 TABLET ORAL
Qty: 30 | Refills: 6
Start: 2021-10-02 | End: 2022-04-29

## 2021-10-02 RX ORDER — CLOPIDOGREL BISULFATE 75 MG/1
1 TABLET, FILM COATED ORAL
Qty: 30 | Refills: 8
Start: 2021-10-02 | End: 2022-06-28

## 2021-10-02 RX ORDER — POTASSIUM CHLORIDE 20 MEQ
20 PACKET (EA) ORAL ONCE
Refills: 0 | Status: COMPLETED | OUTPATIENT
Start: 2021-10-02 | End: 2021-10-02

## 2021-10-02 RX ADMIN — LOSARTAN POTASSIUM 100 MILLIGRAM(S): 100 TABLET, FILM COATED ORAL at 07:55

## 2021-10-02 RX ADMIN — CLOPIDOGREL BISULFATE 75 MILLIGRAM(S): 75 TABLET, FILM COATED ORAL at 12:28

## 2021-10-02 RX ADMIN — PANTOPRAZOLE SODIUM 40 MILLIGRAM(S): 20 TABLET, DELAYED RELEASE ORAL at 05:37

## 2021-10-02 RX ADMIN — Medication 20 MILLIEQUIVALENT(S): at 08:21

## 2021-10-02 RX ADMIN — MAGNESIUM OXIDE 400 MG ORAL TABLET 400 MILLIGRAM(S): 241.3 TABLET ORAL at 08:21

## 2021-10-02 RX ADMIN — Medication 81 MILLIGRAM(S): at 12:28

## 2021-10-02 RX ADMIN — Medication 3 UNIT(S): at 08:31

## 2021-10-02 RX ADMIN — ISOSORBIDE MONONITRATE 30 MILLIGRAM(S): 60 TABLET, EXTENDED RELEASE ORAL at 12:29

## 2021-10-02 RX ADMIN — Medication 4: at 10:54

## 2021-10-02 RX ADMIN — GABAPENTIN 600 MILLIGRAM(S): 400 CAPSULE ORAL at 06:44

## 2021-10-02 NOTE — DISCHARGE NOTE NURSING/CASE MANAGEMENT/SOCIAL WORK - PATIENT PORTAL LINK FT
You can access the FollowMyHealth Patient Portal offered by [facility] by registering at the following website: [email or web address]. By joining [email or web address]’s FollowMyHealth portal, you will also be able to view your health information using other applications (apps) compatible with our system. You can access the FollowMyHealth Patient Portal offered by [facility] by registering at the following website: [email or web address]. By joining [employer]’s FollowMyHealth portal, you will also be able to view your health information using other applications (apps) compatible with our system. You can access the FollowMyHealth Patient Portal offered by [facility] by registering at the following website: [email or web address]. By joining [email or web address]’s FollowMyHealth portal, you will also be able to view your health information using other applications (apps) compatible with our system.

## 2021-10-02 NOTE — DISCHARGE NOTE NURSING/CASE MANAGEMENT/SOCIAL WORK - NSDCPEFALRISK_GEN_ALL_CORE
For information on Fall & injury Prevention, visit [email or web address] For information on Fall & injury Prevention, visit [email or web address] For information on Fall & injury Prevention, visit [email or web address]

## 2021-10-02 NOTE — DISCHARGE NOTE PROVIDER - HOSPITAL COURSE
INCOMPLETE    76 Y F Icelandic speaking  POOR HISTORIAN former smoker with pmh colon cancer (s/p chemo/radiation treatment 15 yrs ago), asthma (no hosp/intubation), HTN, HLD, DM II who presented to her cardiologist c/o progressively worsening L sided chest pressure 6/10 with radiation to jaw and associated nausea, dizziness, diaphoresis, fatigue  palpitations, and SOB with exertion of 1 block occurring for 3 months.  Symptoms resolve with rest.  Pt denies orthopnea, PND, LE edema, V/D, fever, chills, sick contact, or recent travel.  ECHO 9/23/21: EF 65-70%, mild diastolic dysfunction, mild MR.  In light of risk factors, CCS angina class III symptoms, pt recommended for cardiac angiogram with possible intervention if clinically indicated.     s/p cardiac catheterization 10/1/21: 3V CAD s/p CSI/MEG oLAD, CSI/MEG mLAD, MEG x2 OM2 80%. LM normal, o/pLAD 70-80% calcified, mLAD 80% calcified, OM2 85% diffuse, pRCA 75%, mRCA 80%, RPDA 75%. Staged PCI of RCA in 5 weeks. EF/EDP normal. R radial access    Pt was admitted to [address] [place] [address] overnight for observation. Today pt was seen and examined at bedside, denies complaints of chest pain, dizziness, SOB, palpitations, pain, LE edema, fever, chills. Right radial access site soft, no bleeding or swelling at site, radial pulse 2+. No events on tele overnight, VSS, Labs unremarkable/stable, Physical exam WNL. Pt was seen and examined by cardiology attending as well and is deemed stable for discharge per Dr. Martin. Pt is to continue ASA/Plavix, Vascepa 2G PO BID and Simvastatin 20mg PO QD. Pt is to follow up with cardiologist Dr. Troy in 1-2 weeks for post discharge check-up. All medications needing refills were e-prescribed to pt’s preferred pharmacy.        Applicable Metrics  Cardiac Rehab (Post PCI):            *Education on benefits of Cardiac Rehab provided to patient: Yes         *Referral and Prescription Given for Cardiac Rehab : Yes         *Pt given list of locations & instructed to contact their insurance company to review list of participating providers  DAPT/ Statin Prescribed (PCI this admission):  Yes   INCOMPLETE    76 Y F Lithuanian speaking  POOR HISTORIAN former smoker with pmh colon cancer (s/p chemo/radiation treatment 15 yrs ago), asthma (no hosp/intubation), HTN, HLD, DM II who presented to her cardiologist c/o progressively worsening L sided chest pressure 6/10 with radiation to jaw and associated nausea, dizziness, diaphoresis, fatigue  palpitations, and SOB with exertion of 1 block occurring for 3 months.  Symptoms resolve with rest.  Pt denies orthopnea, PND, LE edema, V/D, fever, chills, sick contact, or recent travel.  ECHO 9/23/21: EF 65-70%, mild diastolic dysfunction, mild MR.  In light of risk factors, CCS angina class III symptoms, pt recommended for cardiac angiogram with possible intervention if clinically indicated.     s/p cardiac catheterization 10/1/21: 3V CAD s/p CSI/MEG oLAD, CSI/MEG mLAD, MEG x2 OM2 80%. LM normal, o/pLAD 70-80% calcified, mLAD 80% calcified, OM2 85% diffuse, pRCA 75%, mRCA 80%, RPDA 75%. Staged PCI of RCA in 5 weeks. EF/EDP normal. R radial access    Pt was admitted to [address] [place] [address] overnight for observation. Today pt was seen and examined at bedside, denies complaints of chest pain, dizziness, SOB, palpitations, pain, LE edema, fever, chills. Right radial access site soft, no bleeding or swelling at site, radial pulse 2+. No events on tele overnight, VSS, Labs unremarkable/stable, Physical exam WNL. Pt was seen and examined by cardiology attending as well and is deemed stable for discharge per Dr. Martin. Pt is to continue ASA/Plavix, Vascepa 2G PO BID and Simvastatin 20mg PO QD. Pt is to follow up with cardiologist Dr. Troy in 1-2 weeks for post discharge check-up. All medications needing refills were e-prescribed to pt’s preferred pharmacy.        Applicable Metrics  Cardiac Rehab (Post PCI):            *Education on benefits of Cardiac Rehab provided to patient: Yes         *Referral and Prescription Given for Cardiac Rehab : Yes         *Pt given list of locations & instructed to contact their insurance company to review list of participating providers  DAPT/ Statin Prescribed (PCI this admission):  Yes   INCOMPLETE    76 Y F Yakut speaking  POOR HISTORIAN former smoker with pmh colon cancer (s/p chemo/radiation treatment 15 yrs ago), asthma (no hosp/intubation), HTN, HLD, DM II who presented to her cardiologist c/o progressively worsening L sided chest pressure 6/10 with radiation to jaw and associated nausea, dizziness, diaphoresis, fatigue  palpitations, and SOB with exertion of 1 block occurring for 3 months.  Symptoms resolve with rest.  Pt denies orthopnea, PND, LE edema, V/D, fever, chills, sick contact, or recent travel.  ECHO 9/23/21: EF 65-70%, mild diastolic dysfunction, mild MR.  In light of risk factors, CCS angina class III symptoms, pt recommended for cardiac angiogram with possible intervention if clinically indicated.     s/p cardiac catheterization 10/1/21: 3V CAD s/p CSI/MEG oLAD, CSI/MEG mLAD, MEG x2 OM2 80%. LM normal, o/pLAD 70-80% calcified, mLAD 80% calcified, OM2 85% diffuse, pRCA 75%, mRCA 80%, RPDA 75%. Staged PCI of RCA in 5 weeks. EF/EDP normal. R radial access    Pt was admitted to [address] [place] [address] overnight for observation. Today pt was seen and examined at bedside, denies complaints of chest pain, dizziness, SOB, palpitations, pain, LE edema, fever, chills. Right radial access site soft, no bleeding or swelling at site, radial pulse 2+. No events on tele overnight, VSS, Labs unremarkable/stable, Physical exam WNL. Pt was seen and examined by cardiology attending as well and is deemed stable for discharge per Dr. Martin. Pt is to continue ASA/Plavix, Vascepa 2G PO BID and Simvastatin 20mg PO QD. Pt is to follow up with cardiologist Dr. Troy in 1-2 weeks for post discharge check-up. All medications needing refills were e-prescribed to pt’s preferred pharmacy.        Applicable Metrics  Cardiac Rehab (Post PCI):            *Education on benefits of Cardiac Rehab provided to patient: Yes         *Referral and Prescription Given for Cardiac Rehab : Yes         *Pt given list of locations & instructed to contact their insurance company to review list of participating providers  DAPT/ Statin Prescribed (PCI this admission):  Yes   INCOMPLETE    76 Y F Kinyarwanda speaking  POOR HISTORIAN former smoker with pmh colon cancer (s/p chemo/radiation treatment 15 yrs ago), asthma (no hosp/intubation), HTN, HLD, DM II who presented to her cardiologist c/o progressively worsening L sided chest pressure 6/10 with radiation to jaw and associated nausea, dizziness, diaphoresis, fatigue  palpitations, and SOB with exertion of 1 block occurring for 3 months.  Symptoms resolve with rest.  Pt denies orthopnea, PND, LE edema, V/D, fever, chills, sick contact, or recent travel. ECHO 9/23/21: EF 65-70%, mild diastolic dysfunction, mild MR.  In light of risk factors, CCS angina class III symptoms, pt recommended for cardiac angiogram with possible intervention if clinically indicated.     s/p cardiac catheterization 10/1/21: 3V CAD s/p CSI/MEG oLAD, CSI/MEG mLAD, MEG x2 OM2 80%. LM normal, o/pLAD 70-80% calcified, mLAD 80% calcified, OM2 85% diffuse, pRCA 75%, mRCA 80%, RPDA 75%. Staged PCI of RCA in 5 weeks. EF/EDP normal. R radial access    Pt was admitted to [address] [place] [address] overnight for observation. Today pt was seen and examined at bedside, denies complaints of chest pain, dizziness, SOB, palpitations, pain, LE edema, fever, chills. Right radial access site soft, no bleeding or swelling at site, radial pulse 2+. No events on tele overnight, VSS, Hgb A1c 9.4 otherwise labs unremarkable/stable, Physical exam WNL. Pt was seen and examined by cardiology attending as well and is deemed stable for discharge per Dr. Martin. Pt is to continue ASA/Plavix, Vascepa 2G PO BID and Simvastatin 20mg PO QD. Pt is to follow up with cardiologist Dr. Troy in 1-2 weeks for post discharge check-up. All medications needing refills were e-prescribed to pt’s preferred pharmacy.        Applicable Metrics  Cardiac Rehab (Post PCI):            *Education on benefits of Cardiac Rehab provided to patient: Yes         *Referral and Prescription Given for Cardiac Rehab : Yes         *Pt given list of locations & instructed to contact their insurance company to review list of participating providers  DAPT/ Statin Prescribed (PCI this admission):  Yes   INCOMPLETE    76 Y F Upper sorbian speaking  POOR HISTORIAN former smoker with pmh colon cancer (s/p chemo/radiation treatment 15 yrs ago), asthma (no hosp/intubation), HTN, HLD, DM II who presented to her cardiologist c/o progressively worsening L sided chest pressure 6/10 with radiation to jaw and associated nausea, dizziness, diaphoresis, fatigue  palpitations, and SOB with exertion of 1 block occurring for 3 months.  Symptoms resolve with rest.  Pt denies orthopnea, PND, LE edema, V/D, fever, chills, sick contact, or recent travel. ECHO 9/23/21: EF 65-70%, mild diastolic dysfunction, mild MR.  In light of risk factors, CCS angina class III symptoms, pt recommended for cardiac angiogram with possible intervention if clinically indicated.     s/p cardiac catheterization 10/1/21: 3V CAD s/p CSI/MEG oLAD, CSI/MEG mLAD, MEG x2 OM2 80%. LM normal, o/pLAD 70-80% calcified, mLAD 80% calcified, OM2 85% diffuse, pRCA 75%, mRCA 80%, RPDA 75%. Staged PCI of RCA in 5 weeks. EF/EDP normal. R radial access    Pt was admitted to [address] [place] [address] overnight for observation. Today pt was seen and examined at bedside, denies complaints of chest pain, dizziness, SOB, palpitations, pain, LE edema, fever, chills. Right radial access site soft, no bleeding or swelling at site, radial pulse 2+. No events on tele overnight, VSS, Hgb A1c 9.4 otherwise labs unremarkable/stable, Physical exam WNL. Pt was seen and examined by cardiology attending as well and is deemed stable for discharge per Dr. Martin. Pt is to continue ASA/Plavix, Vascepa 2G PO BID and Simvastatin 20mg PO QD. Pt is to follow up with cardiologist Dr. Troy in 1-2 weeks for post discharge check-up. All medications needing refills were e-prescribed to pt’s preferred pharmacy.        Applicable Metrics  Cardiac Rehab (Post PCI):            *Education on benefits of Cardiac Rehab provided to patient: Yes         *Referral and Prescription Given for Cardiac Rehab : Yes         *Pt given list of locations & instructed to contact their insurance company to review list of participating providers  DAPT/ Statin Prescribed (PCI this admission):  Yes   INCOMPLETE    76 Y F Amharic speaking  POOR HISTORIAN former smoker with pmh colon cancer (s/p chemo/radiation treatment 15 yrs ago), asthma (no hosp/intubation), HTN, HLD, DM II who presented to her cardiologist c/o progressively worsening L sided chest pressure 6/10 with radiation to jaw and associated nausea, dizziness, diaphoresis, fatigue  palpitations, and SOB with exertion of 1 block occurring for 3 months.  Symptoms resolve with rest.  Pt denies orthopnea, PND, LE edema, V/D, fever, chills, sick contact, or recent travel. ECHO 9/23/21: EF 65-70%, mild diastolic dysfunction, mild MR.  In light of risk factors, CCS angina class III symptoms, pt recommended for cardiac angiogram with possible intervention if clinically indicated.     s/p cardiac catheterization 10/1/21: 3V CAD s/p CSI/MEG oLAD, CSI/MEG mLAD, MEG x2 OM2 80%. LM normal, o/pLAD 70-80% calcified, mLAD 80% calcified, OM2 85% diffuse, pRCA 75%, mRCA 80%, RPDA 75%. Staged PCI of RCA in 5 weeks. EF/EDP normal. R radial access    Pt was admitted to [address] [place] [address] overnight for observation. Today pt was seen and examined at bedside, denies complaints of chest pain, dizziness, SOB, palpitations, pain, LE edema, fever, chills. Right radial access site soft, no bleeding or swelling at site, radial pulse 2+. No events on tele overnight, VSS, Hgb A1c 9.4 otherwise labs unremarkable/stable, Physical exam WNL. Pt was seen and examined by cardiology attending as well and is deemed stable for discharge per Dr. Martin. Pt is to continue ASA/Plavix, Vascepa 2G PO BID and Simvastatin 20mg PO QD. Pt is to follow up with cardiologist Dr. Troy in 1-2 weeks for post discharge check-up. All medications needing refills were e-prescribed to pt’s preferred pharmacy.        Applicable Metrics  Cardiac Rehab (Post PCI):            *Education on benefits of Cardiac Rehab provided to patient: Yes         *Referral and Prescription Given for Cardiac Rehab : Yes         *Pt given list of locations & instructed to contact their insurance company to review list of participating providers  DAPT/ Statin Prescribed (PCI this admission):  Yes   76 Y F Sinhala speaking  POOR HISTORIAN former smoker with pmh colon cancer (s/p chemo/radiation treatment 15 yrs ago), asthma (no hosp/intubation), HTN, HLD, DM II who presented to her cardiologist c/o progressively worsening L sided chest pressure 6/10 with radiation to jaw and associated nausea, dizziness, diaphoresis, fatigue  palpitations, and SOB with exertion of 1 block occurring for 3 months.  Symptoms resolve with rest.  Pt denies orthopnea, PND, LE edema, V/D, fever, chills, sick contact, or recent travel. ECHO 9/23/21: EF 65-70%, mild diastolic dysfunction, mild MR.  In light of risk factors, CCS angina class III symptoms, pt recommended for cardiac angiogram with possible intervention if clinically indicated.     s/p cardiac catheterization 10/1/21: 3V CAD s/p CSI/MEG oLAD, CSI/MEG mLAD, MEG x2 OM2 80%. LM normal, o/pLAD 70-80% calcified, mLAD 80% calcified, OM2 85% diffuse, pRCA 75%, mRCA 80%, RPDA 75%. Staged PCI of RCA in 5 weeks. EF/EDP normal. R radial access    Pt was admitted to [address] [place] [address] overnight for observation. Today pt was seen and examined at bedside, denies complaints of chest pain, dizziness, SOB, palpitations, pain, LE edema, fever, chills. Right radial access site soft, no bleeding or swelling at site, radial pulse 2+. No events on tele overnight, VSS, Hgb A1c 9.4 otherwise labs unremarkable/stable, Physical exam WNL. Pt deemed stable for discharge. Pt is to follow up with cardiologist Dr. Troy in 1-2 weeks for post discharge check-up. All medications needing refills were e-prescribed to pt’s preferred pharmacy.        Applicable Metrics  Cardiac Rehab (Post PCI):            *Education on benefits of Cardiac Rehab provided to patient: Yes         *Referral and Prescription Given for Cardiac Rehab : Yes         *Pt given list of locations & instructed to contact their insurance company to review list of participating providers  DAPT/ Statin Prescribed (PCI this admission):  Yes      DC Meds:           76 Y F Sinhala speaking  POOR HISTORIAN former smoker with pmh colon cancer (s/p chemo/radiation treatment 15 yrs ago), asthma (no hosp/intubation), HTN, HLD, DM II who presented to her cardiologist c/o progressively worsening L sided chest pressure 6/10 with radiation to jaw and associated nausea, dizziness, diaphoresis, fatigue  palpitations, and SOB with exertion of 1 block occurring for 3 months.  Symptoms resolve with rest.  Pt denies orthopnea, PND, LE edema, V/D, fever, chills, sick contact, or recent travel. ECHO 9/23/21: EF 65-70%, mild diastolic dysfunction, mild MR.  In light of risk factors, CCS angina class III symptoms, pt recommended for cardiac angiogram with possible intervention if clinically indicated.     s/p cardiac catheterization 10/1/21: 3V CAD s/p CSI/MEG oLAD, CSI/MEG mLAD, MEG x2 OM2 80%. LM normal, o/pLAD 70-80% calcified, mLAD 80% calcified, OM2 85% diffuse, pRCA 75%, mRCA 80%, RPDA 75%. Staged PCI of RCA in 5 weeks. EF/EDP normal. R radial access    Pt was admitted to [address] [place] [address] overnight for observation. Today pt was seen and examined at bedside, denies complaints of chest pain, dizziness, SOB, palpitations, pain, LE edema, fever, chills. Right radial access site soft, no bleeding or swelling at site, radial pulse 2+. No events on tele overnight, VSS, Hgb A1c 9.4 otherwise labs unremarkable/stable, Physical exam WNL. Pt deemed stable for discharge. Pt is to follow up with cardiologist Dr. Troy in 1-2 weeks for post discharge check-up. All medications needing refills were e-prescribed to pt’s preferred pharmacy.        Applicable Metrics  Cardiac Rehab (Post PCI):            *Education on benefits of Cardiac Rehab provided to patient: Yes         *Referral and Prescription Given for Cardiac Rehab : Yes         *Pt given list of locations & instructed to contact their insurance company to review list of participating providers  DAPT/ Statin Prescribed (PCI this admission):  Yes      DC Meds:           76 Y F Khmer speaking  POOR HISTORIAN former smoker with pmh colon cancer (s/p chemo/radiation treatment 15 yrs ago), asthma (no hosp/intubation), HTN, HLD, DM II who presented to her cardiologist c/o progressively worsening L sided chest pressure 6/10 with radiation to jaw and associated nausea, dizziness, diaphoresis, fatigue  palpitations, and SOB with exertion of 1 block occurring for 3 months.  Symptoms resolve with rest.  Pt denies orthopnea, PND, LE edema, V/D, fever, chills, sick contact, or recent travel. ECHO 9/23/21: EF 65-70%, mild diastolic dysfunction, mild MR.  In light of risk factors, CCS angina class III symptoms, pt recommended for cardiac angiogram with possible intervention if clinically indicated.     s/p cardiac catheterization 10/1/21: 3V CAD s/p CSI/MEG oLAD, CSI/MEG mLAD, MEG x2 OM2 80%. LM normal, o/pLAD 70-80% calcified, mLAD 80% calcified, OM2 85% diffuse, pRCA 75%, mRCA 80%, RPDA 75%. Staged PCI of RCA in 5 weeks. EF/EDP normal. R radial access    Pt was admitted to [address] [place] [address] overnight for observation. Today pt was seen and examined at bedside, denies complaints of chest pain, dizziness, SOB, palpitations, pain, LE edema, fever, chills. Right radial access site soft, no bleeding or swelling at site, radial pulse 2+. No events on tele overnight, VSS, Hgb A1c 9.4 otherwise labs unremarkable/stable, Physical exam WNL. Pt deemed stable for discharge. Pt is to follow up with cardiologist Dr. Troy in 1-2 weeks for post discharge check-up. All medications needing refills were e-prescribed to pt’s preferred pharmacy.        Applicable Metrics  Cardiac Rehab (Post PCI):            *Education on benefits of Cardiac Rehab provided to patient: Yes         *Referral and Prescription Given for Cardiac Rehab : Yes         *Pt given list of locations & instructed to contact their insurance company to review list of participating providers  DAPT/ Statin Prescribed (PCI this admission):  Yes      DC Meds:           76 Y F Chinese speaking  POOR HISTORIAN former smoker with pmh colon cancer (s/p chemo/radiation treatment 15 yrs ago), asthma (no hosp/intubation), HTN, HLD, DM II who presented to her cardiologist c/o progressively worsening L sided chest pressure 6/10 with radiation to jaw and associated nausea, dizziness, diaphoresis, fatigue  palpitations, and SOB with exertion of 1 block occurring for 3 months.  Symptoms resolve with rest.  Pt denies orthopnea, PND, LE edema, V/D, fever, chills, sick contact, or recent travel. ECHO 9/23/21: EF 65-70%, mild diastolic dysfunction, mild MR.  In light of risk factors, CCS angina class III symptoms, pt recommended for cardiac angiogram with possible intervention if clinically indicated.     s/p cardiac catheterization 10/1/21: 3V CAD s/p CSI/MEG oLAD, CSI/MEG mLAD, MEG x2 OM2 80%. LM normal, o/pLAD 70-80% calcified, mLAD 80% calcified, OM2 85% diffuse, pRCA 75%, mRCA 80%, RPDA 75%. Staged PCI of RCA in 5 weeks. EF/EDP normal. R radial access    Pt was admitted to [address] [place] [address] overnight for observation. Today pt was seen and examined at bedside, denies complaints of chest pain, dizziness, SOB, palpitations, pain, LE edema, fever, chills. Right radial access site soft, no bleeding or swelling at site, radial pulse 2+. No events on tele overnight, VSS, Hgb A1c 9.4 otherwise labs unremarkable/stable, Physical exam WNL. Pt deemed stable for discharge. Pt is to follow up with cardiologist Dr. Troy in 1-2 weeks for post discharge check-up. All medications needing refills were e-prescribed to pt’s preferred pharmacy.        Applicable Metrics  Cardiac Rehab (Post PCI):            *Education on benefits of Cardiac Rehab provided to patient: Yes         *Referral and Prescription Given for Cardiac Rehab : Yes         *Pt given list of locations & instructed to contact their insurance company to review list of participating providers  DAPT/ Statin Prescribed (PCI this admission):  Yes      DC Meds:    ASA 81mg  Plavix 75mg  Imdur 60mg  Losartan - HCTZ 100-12.5mg   Simvastatin 20mg  Vascepa 2g BID 76 Y F Telugu speaking  POOR HISTORIAN former smoker with pmh colon cancer (s/p chemo/radiation treatment 15 yrs ago), asthma (no hosp/intubation), HTN, HLD, DM II who presented to her cardiologist c/o progressively worsening L sided chest pressure 6/10 with radiation to jaw and associated nausea, dizziness, diaphoresis, fatigue  palpitations, and SOB with exertion of 1 block occurring for 3 months.  Symptoms resolve with rest.  Pt denies orthopnea, PND, LE edema, V/D, fever, chills, sick contact, or recent travel. ECHO 9/23/21: EF 65-70%, mild diastolic dysfunction, mild MR.  In light of risk factors, CCS angina class III symptoms, pt recommended for cardiac angiogram with possible intervention if clinically indicated.     s/p cardiac catheterization 10/1/21: 3V CAD s/p CSI/MEG oLAD, CSI/MEG mLAD, MEG x2 OM2 80%. LM normal, o/pLAD 70-80% calcified, mLAD 80% calcified, OM2 85% diffuse, pRCA 75%, mRCA 80%, RPDA 75%. Staged PCI of RCA in 5 weeks. EF/EDP normal. R radial access    Pt was admitted to [address] [place] [address] overnight for observation. Today pt was seen and examined at bedside, denies complaints of chest pain, dizziness, SOB, palpitations, pain, LE edema, fever, chills. Right radial access site soft, no bleeding or swelling at site, radial pulse 2+. No events on tele overnight, VSS, Hgb A1c 9.4 otherwise labs unremarkable/stable, Physical exam WNL. Pt deemed stable for discharge. Pt is to follow up with cardiologist Dr. Troy in 1-2 weeks for post discharge check-up. All medications needing refills were e-prescribed to pt’s preferred pharmacy.        Applicable Metrics  Cardiac Rehab (Post PCI):            *Education on benefits of Cardiac Rehab provided to patient: Yes         *Referral and Prescription Given for Cardiac Rehab : Yes         *Pt given list of locations & instructed to contact their insurance company to review list of participating providers  DAPT/ Statin Prescribed (PCI this admission):  Yes      DC Meds:    ASA 81mg  Plavix 75mg  Imdur 60mg  Losartan - HCTZ 100-12.5mg   Simvastatin 20mg  Vascepa 2g BID 76 Y F Romanian speaking  POOR HISTORIAN former smoker with pmh colon cancer (s/p chemo/radiation treatment 15 yrs ago), asthma (no hosp/intubation), HTN, HLD, DM II who presented to her cardiologist c/o progressively worsening L sided chest pressure 6/10 with radiation to jaw and associated nausea, dizziness, diaphoresis, fatigue  palpitations, and SOB with exertion of 1 block occurring for 3 months.  Symptoms resolve with rest.  Pt denies orthopnea, PND, LE edema, V/D, fever, chills, sick contact, or recent travel. ECHO 9/23/21: EF 65-70%, mild diastolic dysfunction, mild MR.  In light of risk factors, CCS angina class III symptoms, pt recommended for cardiac angiogram with possible intervention if clinically indicated.     s/p cardiac catheterization 10/1/21: 3V CAD s/p CSI/MEG oLAD, CSI/MEG mLAD, MEG x2 OM2 80%. LM normal, o/pLAD 70-80% calcified, mLAD 80% calcified, OM2 85% diffuse, pRCA 75%, mRCA 80%, RPDA 75%. Staged PCI of RCA in 5 weeks. EF/EDP normal. R radial access    Pt was admitted to [address] [place] [address] overnight for observation. Today pt was seen and examined at bedside, denies complaints of chest pain, dizziness, SOB, palpitations, pain, LE edema, fever, chills. Right radial access site soft, no bleeding or swelling at site, radial pulse 2+. No events on tele overnight, VSS, Hgb A1c 9.4 otherwise labs unremarkable/stable, Physical exam WNL. Pt deemed stable for discharge. Pt is to follow up with cardiologist Dr. Troy in 1-2 weeks for post discharge check-up. All medications needing refills were e-prescribed to pt’s preferred pharmacy.        Applicable Metrics  Cardiac Rehab (Post PCI):            *Education on benefits of Cardiac Rehab provided to patient: Yes         *Referral and Prescription Given for Cardiac Rehab : Yes         *Pt given list of locations & instructed to contact their insurance company to review list of participating providers  DAPT/ Statin Prescribed (PCI this admission):  Yes      DC Meds:    ASA 81mg  Plavix 75mg  Imdur 60mg  Losartan - HCTZ 100-12.5mg   Simvastatin 20mg  Vascepa 2g BID

## 2021-10-02 NOTE — DISCHARGE NOTE PROVIDER - CARE PROVIDER_API CALL
Ritchie Troy)  Cardiovascular Disease; Internal Medicine  [address] [place] [address]  Phone: [phone]  Fax: [phone]  Follow Up Time:    Ritchie Troy)  Cardiovascular Disease; Internal Medicine  [address] [place] [address]  Phone: [phone]  Fax: [phone]  Follow Up Time:    Ritchie Troy)  Cardiovascular Disease; Internal Medicine  [address], [address] [place] [address]  Phone: [phone]  Fax: [phone]  Follow Up Time:    Ritchie Troy)  Cardiovascular Disease; Internal Medicine  [address] [place] [address]  Phone: [phone]  Fax: [phone]  Follow Up Time: 2 weeks   Ritchie Troy)  Cardiovascular Disease; Internal Medicine  [address] [place] [address]  Phone: [phone]  Fax: [phone]  Follow Up Time: 2 weeks   Ritchie Troy)  Cardiovascular Disease; Internal Medicine  [address] [place] [address]  Phone: [phone]  Fax: [phone]  Follow Up Time: 2 weeks

## 2021-10-02 NOTE — DISCHARGE NOTE PROVIDER - NSDCFUADDINST_GEN_ALL_CORE_FT
A Mediterranean Diet is recommended! Some suggestions include continue incorporating 2 or more servings per day of vegetables, fruits, and whole grains. Increase intake of fish and legumes/beans to 2 or more servings per week. Aim to increase intake of healthy fats, such as olive oil and avocados, and have a handful of nuts/seeds most days. Reduce red/processed meat consumption to 2 or fewer times per week.       The catheter from your wrist was removed and bleeding was stopped by manual pressure. After 24hours you may take off the dressing and shower. Wash the site with soap and water.  There is no need to put on another bandage. Do not lift anything heavier than 5 pounds fo 5 days. Avoid tub baths, hot tubs or swimming for 5 days.      Call the Interventional Cardiology Team at [phone] if any of following occur pertaining to your vascular access site:  Bleeding or hematoma formation (collection of blood under the skin), drainage or redness at the puncture site, numbness, decrease in strength, coolness or pale coloration of skin of the leg or hand.   A Mediterranean Diet is recommended! Some suggestions include continue incorporating 2 or more servings per day of vegetables, fruits, and whole grains. Increase intake of fish and legumes/beans to 2 or more servings per week. Aim to increase intake of healthy fats, such as olive oil and avocados, and have a handful of nuts/seeds most days. Reduce red/processed meat consumption to 2 or fewer times per week.       The catheter from your wrist was removed and bleeding was stopped by manual pressure. After 24hours you may take off the dressing and shower. Wash the site with soap and water.  There is no need to put on another bandage. Do not lift anything heavier than 5 pounds fo 5 days. Avoid tub baths, hot tubs or swimming for 5 days.      Call the Interventional Cardiology Team at [phone] if any of following occur pertaining to your vascular access site:  Bleeding or hematoma formation (collection of blood under the skin), drainage or redness at the puncture site, numbness, decrease in strength, coolness or pale coloration of skin of the leg or hand.   A Mediterranean Diet is recommended! Some suggestions include continue incorporating 2 or more servings per day of vegetables, fruits, and whole grains. Increase intake of fish and legumes/beans to 2 or more servings per week. Aim to increase intake of healthy fats, such as olive oil and avocados, and have a handful of nuts/seeds most days. Reduce red/processed meat consumption to 2 or fewer times per week.       The catheter from your wrist was removed and bleeding was stopped by manual pressure. After 24hours you may take off the dressing and shower. Wash the site with soap and water.  There is no need to put on another bandage. Do not lift anything heavier than 5 pounds fo 5 days. Avoid tub baths, hot tubs or swimming for 5 days.      Call the Interventional Cardiology Team at [phone] if any of following occur pertaining to your vascular access site:  Bleeding or hematoma formation (collection of blood under the skin), drainage or redness at the puncture site, numbness, decrease in strength, coolness or pale coloration of skin of the leg or hand.   - A Mediterranean Diet is recommended! Some suggestions include continue incorporating 2 or more servings per day of vegetables, fruits, and whole grains. Increase intake of fish and legumes/beans to 2 or more servings per week. Aim to increase intake of healthy fats, such as olive oil and avocados, and have a handful of nuts/seeds most days. Reduce red/processed meat consumption to 2 or fewer times per week.  - - We have provided you with a prescription for cardiac rehab which is medically supervised exercise program for your heart and has been shown to improve the quantity and quality of life of people with heart disease like yours.   - You should attend cardiac rehab 3 times per week for 12 weeks. We have provided you with a list of nearby facilities. Please call your insurance carrier to determine which of these facilities are covered under your plan. Please bring this prescription with you to your follow up appointment with your cardiologist who can then further assist you to enroll into a cardiac rehab program.     The catheter from your wrist was removed and bleeding was stopped by manual pressure. After 24hours you may take off the dressing and shower. Wash the site with soap and water.  There is no need to put on another bandage. Do not lift anything heavier than 5 pounds fo 5 days. Avoid tub baths, hot tubs or swimming for 5 days.   - We have provided you with a prescription for cardiac rehab which is medically supervised exercise program for your heart and has been shown to improve the quantity and quality of life of people with heart disease like yours. You should attend cardiac rehab 3 times per week for 12 weeks. We have provided you with a list of nearby facilities. Please call your insurance carrier to determine which of these facilities are covered under your plan. Please bring this prescription with you to your follow up appointment with your cardiologist who can then further assist you to enroll into a cardiac rehab program.  - The catheter from your wrist was removed and bleeding was stopped by manual pressure. After 24hours you may take off the dressing and shower. Wash the site with soap and water.  There is no need to put on another bandage. Do not lift anything heavier than 5 pounds fo 5 days. Avoid tub baths, hot tubs or swimming for 5 days.

## 2021-10-02 NOTE — DISCHARGE NOTE PROVIDER - CARE PROVIDERS DIRECT ADDRESSES
[email or web address]

## 2021-10-02 NOTE — DISCHARGE NOTE PROVIDER - NSDCCPCAREPLAN_GEN_ALL_CORE_FT
PRINCIPAL DISCHARGE DIAGNOSIS  Diagnosis: CAD (coronary artery disease)  Assessment and Plan of Treatment: You have a diagnosis of coronary artery disease and received a 2 stents to your left anterior coronary artery and 1 stent to the first obtuse marginal artery. You have a residual blockage in your right coronary artery for which you will need to return for another angiogram in 5 weeks. Your [facility] cardiiologist's office will coordinate this for you. You have been started on Aspirin 81mg daily and Plavix (Clopidogrel) 75mg daily. You MUST continue taking the daily Aspirin and Plavix to ensure your stent does not close. DO NOT STOP THESE MEDICATIONS FOR ANY REASON UNLESS OTHERWISE INDICATED BY YOUR CARDIOLOGIST BECAUSE THIS WILL PUT YOU AT RISK FOR A HEART ATTACK. You should refrain from strenuous activity and heavy lifting for 1 week. Please make a follow up appointment with your cardiologist within 1-2 weeks of your discharge. All of your prescriptions have been sent electronically to your pharmacy.  We have provided you with a prescription for cardiac rehab which is medically supervised exercise program for your heart and has been shown to improve the quantity and quality of life of people with heart disease like yours. You should attend cardiac rehab 3 times per week for 12 weeks. We have provided you with a list of nearby facilities. Please call your insurance carrier to determine which of these facilities are covered under your plan. Please bring this prescription with you to your follow up appointment with your cardiologist who can then further assist you to enroll into a cardiac rehab program.      SECONDARY DISCHARGE DIAGNOSES  Diagnosis: Hyperlipidemia  Assessment and Plan of Treatment: Your LDL is 101 mg/dL and your goal LDL is less than 100 mg/dL. LDL is also known as "bad cholesterol" because it takes cholesterol to your arteries, where it may collect in artery walls. Too much cholesterol in your arteries may lead to a buildup of plaque known as atherosclerosis and can cause heart disease. ?You can lower your LDL with medications and lifestyle modifications such as weight loss in overweight patients, aerobic exercise, and eating diets lower in saturated fats. Please continue taking Simvastatin 20mg daily and Vascepa 2G twice daily.    Diagnosis: Diabetes mellitus  Assessment and Plan of Treatment: Your Hemoglobin A1c is 9.4. Your goal Hemoglobin A1c is less than 7.0%, This number measures your average blood sugar level over the last three months. Ways to lower this number include: diet, exercise, monitoring your fingersticks, adhering to your medication regimen and regular follow up during you Endocrinologist/Primary Care Doctor.  Please HOLD your Metformin for 2 days to prevent interaction with the contrast dye you received and restart on    Diagnosis: Hypertension  Assessment and Plan of Treatment: Please continue to take Losartan/HCTZ 100-12.5mg daily to maintain a normal blood pressure.     PRINCIPAL DISCHARGE DIAGNOSIS  Diagnosis: CAD (coronary artery disease)  Assessment and Plan of Treatment: You have a diagnosis of coronary artery disease and received a 2 stents to your left anterior coronary artery and 1 stent to the first obtuse marginal artery. You have a residual blockage in your right coronary artery for which you will need to return for another angiogram in 5 weeks. Your [facility] cardiiologist's office will coordinate this for you. You have been started on Aspirin 81mg daily and Plavix (Clopidogrel) 75mg daily. You MUST continue taking the daily Aspirin and Plavix to ensure your stent does not close. DO NOT STOP THESE MEDICATIONS FOR ANY REASON UNLESS OTHERWISE INDICATED BY YOUR CARDIOLOGIST BECAUSE THIS WILL PUT YOU AT RISK FOR A HEART ATTACK. You should refrain from strenuous activity and heavy lifting for 1 week. Please make a follow up appointment with your cardiologist within 1-2 weeks of your discharge. All of your prescriptions have been sent electronically to your pharmacy.  We have provided you with a prescription for cardiac rehab which is medically supervised exercise program for your heart and has been shown to improve the quantity and quality of life of people with heart disease like yours. You should attend cardiac rehab 3 times per week for 12 weeks. We have provided you with a list of nearby facilities. Please call your insurance carrier to determine which of these facilities are covered under your plan. Please bring this prescription with you to your follow up appointment with your cardiologist who can then further assist you to enroll into a cardiac rehab program.      SECONDARY DISCHARGE DIAGNOSES  Diagnosis: Hyperlipidemia  Assessment and Plan of Treatment: Your LDL is 101 mg/dL and your goal LDL is less than 100 mg/dL. LDL is also known as "bad cholesterol" because it takes cholesterol to your arteries, where it may collect in artery walls. Too much cholesterol in your arteries may lead to a buildup of plaque known as atherosclerosis and can cause heart disease. ?You can lower your LDL with medications and lifestyle modifications such as weight loss in overweight patients, aerobic exercise, and eating diets lower in saturated fats. Please continue taking Simvastatin 20mg daily and Vascepa 2G twice daily.    Diagnosis: Diabetes mellitus  Assessment and Plan of Treatment: Your Hemoglobin A1c is 9.4. Your goal Hemoglobin A1c is less than 7.0%, This number measures your average blood sugar level over the last three months. Ways to lower this number include: diet, exercise, monitoring your fingersticks, adhering to your medication regimen and regular follow up during you Endocrinologist/Primary Care Doctor.  Please HOLD your Metformin for 2 days to prevent interaction with the contrast dye you received and restart on    Diagnosis: Hypertension  Assessment and Plan of Treatment: Please continue to take Losartan/HCTZ 100-12.5mg daily to maintain a normal blood pressure.     PRINCIPAL DISCHARGE DIAGNOSIS  Diagnosis: CAD (coronary artery disease)  Assessment and Plan of Treatment: You have a diagnosis of coronary artery disease and received a 2 stents to your left anterior coronary artery and 1 stent to the first obtuse marginal artery. You have a residual blockage in your right coronary artery for which you will need to return for another angiogram in 5 weeks. Your [facility] cardiiologist's office will coordinate this for you. You have been started on Aspirin 81mg daily and Plavix (Clopidogrel) 75mg daily. You MUST continue taking the daily Aspirin and Plavix to ensure your stent does not close. DO NOT STOP THESE MEDICATIONS FOR ANY REASON UNLESS OTHERWISE INDICATED BY YOUR CARDIOLOGIST BECAUSE THIS WILL PUT YOU AT RISK FOR A HEART ATTACK. You should refrain from strenuous activity and heavy lifting for 1 week. Please make a follow up appointment with your cardiologist within 1-2 weeks of your discharge. All of your prescriptions have been sent electronically to your pharmacy.  We have provided you with a prescription for cardiac rehab which is medically supervised exercise program for your heart and has been shown to improve the quantity and quality of life of people with heart disease like yours. You should attend cardiac rehab 3 times per week for 12 weeks. We have provided you with a list of nearby facilities. Please call your insurance carrier to determine which of these facilities are covered under your plan. Please bring this prescription with you to your follow up appointment with your cardiologist who can then further assist you to enroll into a cardiac rehab program.      SECONDARY DISCHARGE DIAGNOSES  Diagnosis: Hyperlipidemia  Assessment and Plan of Treatment: Your LDL is 101 mg/dL and your goal LDL is less than 100 mg/dL. LDL is also known as "bad cholesterol" because it takes cholesterol to your arteries, where it may collect in artery walls. Too much cholesterol in your arteries may lead to a buildup of plaque known as atherosclerosis and can cause heart disease. ?You can lower your LDL with medications and lifestyle modifications such as weight loss in overweight patients, aerobic exercise, and eating diets lower in saturated fats. Please continue taking Simvastatin 20mg daily and Vascepa 2G twice daily.    Diagnosis: Diabetes mellitus  Assessment and Plan of Treatment: Your Hemoglobin A1c is 9.4. Your goal Hemoglobin A1c is less than 7.0%, This number measures your average blood sugar level over the last three months. Ways to lower this number include: diet, exercise, monitoring your fingersticks, adhering to your medication regimen and regular follow up during you Endocrinologist/Primary Care Doctor.  Please HOLD your Metformin for 2 days to prevent interaction with the contrast dye you received and restart on    Diagnosis: Hypertension  Assessment and Plan of Treatment: Please continue to take Losartan/HCTZ 100-12.5mg daily to maintain a normal blood pressure.     PRINCIPAL DISCHARGE DIAGNOSIS  Diagnosis: CAD (coronary artery disease)  Assessment and Plan of Treatment: You have a diagnosis of coronary artery disease and received a 2 stents to your left anterior coronary artery and 1 stent to the first obtuse marginal artery. You have a residual blockage in your right coronary artery for which you will need to return for another angiogram in 5 weeks. Your [facility] cardiiologist's office will coordinate this for you. You have been started on Aspirin 81mg daily and Plavix (Clopidogrel) 75mg daily. You MUST continue taking the daily Aspirin and Plavix to ensure your stent does not close. DO NOT STOP THESE MEDICATIONS FOR ANY REASON UNLESS OTHERWISE INDICATED BY YOUR CARDIOLOGIST BECAUSE THIS WILL PUT YOU AT RISK FOR A HEART ATTACK. You should refrain from strenuous activity and heavy lifting for 1 week. Please make a follow up appointment with your cardiologist within 1-2 weeks of your discharge. All of your prescriptions have been sent electronically to your pharmacy.  We have provided you with a prescription for cardiac rehab which is medically supervised exercise program for your heart and has been shown to improve the quantity and quality of life of people with heart disease like yours. You should attend cardiac rehab 3 times per week for 12 weeks. We have provided you with a list of nearby facilities. Please call your insurance carrier to determine which of these facilities are covered under your plan. Please bring this prescription with you to your follow up appointment with your cardiologist who can then further assist you to enroll into a cardiac rehab program.      SECONDARY DISCHARGE DIAGNOSES  Diagnosis: Hyperlipidemia  Assessment and Plan of Treatment: Your LDL is 101 mg/dL and your goal LDL is less than 100 mg/dL. LDL is also known as "bad cholesterol" because it takes cholesterol to your arteries, where it may collect in artery walls. Too much cholesterol in your arteries may lead to a buildup of plaque known as atherosclerosis and can cause heart disease. You can lower your LDL with medications and lifestyle modifications such as weight loss in overweight patients, aerobic exercise, and eating diets lower in saturated fats. Please continue taking Simvastatin 20mg daily and Vascepa 2G twice daily.    Diagnosis: Diabetes mellitus  Assessment and Plan of Treatment: Your Hemoglobin A1c is 9.4. Your goal Hemoglobin A1c is less than 7.0%, This number measures your average blood sugar level over the last three months. Ways to lower this number include: diet, exercise, monitoring your fingersticks, adhering to your medication regimen and regular follow up during you Endocrinologist/Primary Care Doctor.  Please HOLD your Metformin for 2 days to prevent interaction with the contrast dye you received and restart on Momdau 10/4/21.    Diagnosis: Hypertension  Assessment and Plan of Treatment: Please continue to take Losartan/HCTZ 100-12.5mg daily to maintain a normal blood pressure.     PRINCIPAL DISCHARGE DIAGNOSIS  Diagnosis: CAD (coronary artery disease)  Assessment and Plan of Treatment: You have a diagnosis of coronary artery disease and received a 2 stents to your left anterior coronary artery and 1 stent to the first obtuse marginal artery. You have a residual blockage in your right coronary artery for which you will need to return for another angiogram in 5 weeks. Your [facility] cardiiologist's office will coordinate this for you. You have been started on Aspirin 81mg daily and Plavix (Clopidogrel) 75mg daily. You MUST continue taking the daily Aspirin and Plavix to ensure your stent does not close. DO NOT STOP THESE MEDICATIONS FOR ANY REASON UNLESS OTHERWISE INDICATED BY YOUR CARDIOLOGIST BECAUSE THIS WILL PUT YOU AT RISK FOR A HEART ATTACK. You should refrain from strenuous activity and heavy lifting for 1 week. Please make a follow up appointment with your cardiologist within 1-2 weeks of your discharge. All of your prescriptions have been sent electronically to your pharmacy.  We have provided you with a prescription for cardiac rehab which is medically supervised exercise program for your heart and has been shown to improve the quantity and quality of life of people with heart disease like yours. You should attend cardiac rehab 3 times per week for 12 weeks. We have provided you with a list of nearby facilities. Please call your insurance carrier to determine which of these facilities are covered under your plan. Please bring this prescription with you to your follow up appointment with your cardiologist who can then further assist you to enroll into a cardiac rehab program.      SECONDARY DISCHARGE DIAGNOSES  Diagnosis: Hyperlipidemia  Assessment and Plan of Treatment: Your LDL is 101 mg/dL and your goal LDL is less than 100 mg/dL. LDL is also known as "bad cholesterol" because it takes cholesterol to your arteries, where it may collect in artery walls. Too much cholesterol in your arteries may lead to a buildup of plaque known as atherosclerosis and can cause heart disease. You can lower your LDL with medications and lifestyle modifications such as weight loss in overweight patients, aerobic exercise, and eating diets lower in saturated fats. Please continue taking Simvastatin 20mg daily and Vascepa 2G twice daily.    Diagnosis: Diabetes mellitus  Assessment and Plan of Treatment: Your Hemoglobin A1c is 9.4. Your goal Hemoglobin A1c is less than 7.0%, This number measures your average blood sugar level over the last three months. Ways to lower this number include: diet, exercise, monitoring your fingersticks, adhering to your medication regimen and regular follow up during you Endocrinologist/Primary Care Doctor.  Please HOLD your Metformin for 2 days to prevent interaction with the contrast dye you received and restart on Momdau 10/4/21.    Diagnosis: Hypertension  Assessment and Plan of Treatment: Please continue to take Losartan/HCTZ 100-12.5mg daily to maintain a normal blood pressure.     PRINCIPAL DISCHARGE DIAGNOSIS  Diagnosis: CAD (coronary artery disease)  Assessment and Plan of Treatment: You have a diagnosis of coronary artery disease and received a 2 stents to your left anterior coronary artery and 1 stent to the first obtuse marginal artery. You have a residual blockage in your right coronary artery for which you will need to return for another angiogram in 5 weeks. Your [facility] cardiiologist's office will coordinate this for you. You have been started on Aspirin 81mg daily and Plavix (Clopidogrel) 75mg daily. You MUST continue taking the daily Aspirin and Plavix to ensure your stent does not close. DO NOT STOP THESE MEDICATIONS FOR ANY REASON UNLESS OTHERWISE INDICATED BY YOUR CARDIOLOGIST BECAUSE THIS WILL PUT YOU AT RISK FOR A HEART ATTACK. You should refrain from strenuous activity and heavy lifting for 1 week. Please make a follow up appointment with your cardiologist within 1-2 weeks of your discharge. All of your prescriptions have been sent electronically to your pharmacy.  We have provided you with a prescription for cardiac rehab which is medically supervised exercise program for your heart and has been shown to improve the quantity and quality of life of people with heart disease like yours. You should attend cardiac rehab 3 times per week for 12 weeks. We have provided you with a list of nearby facilities. Please call your insurance carrier to determine which of these facilities are covered under your plan. Please bring this prescription with you to your follow up appointment with your cardiologist who can then further assist you to enroll into a cardiac rehab program.      SECONDARY DISCHARGE DIAGNOSES  Diagnosis: Hyperlipidemia  Assessment and Plan of Treatment: Your LDL is 101 mg/dL and your goal LDL is less than 100 mg/dL. LDL is also known as "bad cholesterol" because it takes cholesterol to your arteries, where it may collect in artery walls. Too much cholesterol in your arteries may lead to a buildup of plaque known as atherosclerosis and can cause heart disease. You can lower your LDL with medications and lifestyle modifications such as weight loss in overweight patients, aerobic exercise, and eating diets lower in saturated fats. Please continue taking Simvastatin 20mg daily and Vascepa 2G twice daily.    Diagnosis: Diabetes mellitus  Assessment and Plan of Treatment: Your Hemoglobin A1c is 9.4. Your goal Hemoglobin A1c is less than 7.0%, This number measures your average blood sugar level over the last three months. Ways to lower this number include: diet, exercise, monitoring your fingersticks, adhering to your medication regimen and regular follow up during you Endocrinologist/Primary Care Doctor.  Please HOLD your Metformin for 2 days to prevent interaction with the contrast dye you received and restart on Momdau 10/4/21.    Diagnosis: Hypertension  Assessment and Plan of Treatment: Please continue to take Losartan/HCTZ 100-12.5mg daily to maintain a normal blood pressure.     PRINCIPAL DISCHARGE DIAGNOSIS  Diagnosis: CAD (coronary artery disease)  Assessment and Plan of Treatment: - You came to the hospital for a planned cardiac cath. You underwent a cardiac cath for which it was seen that you have coronary artery disease. You received 2 STENTS to the left anterior descending artery AND 2 stents to the obtuse marginal artery. You have a residual blockage in your right coronary artery for which you will need to return for another angiogram in 5 weeks. Your [facility] cardiiologist's office will coordinate this for you.  - NEVER MISS A DOSE OF ASPIRIN OR PLAVIX. IF YOU DO, YOU ARE AT RISK OF YOUR STENTS CLOSING AND HAVING A HEART ATTACK. DO NOT STOP THESE TWO MEDICATIONS UNLESS INSTRUCTED TO DO SO BY YOUR CARDIOLOGIST.   - Do NOT drive or operate hazardous machinery for 24 hours. Limit your physical activity for 24-48 hours. Do NOT engage in sports, heavy work or heavy lifting for 72 hours.   - You MAY shower BUT no TUB BATHS, HOT TUBS OR SWIMMING FOR 5 DAYS  - Your procedure was done through your right wrist. If you observe flank bleeding from the puncture site, it is an emergency. Please put direct pressure on the site and go directly to the ER. Bleeding under the skin may also occur and a small "black and blue" may be expected. If the area appears to be expanding or swelling around the puncture site, apply manual compression and go immediately to the nearest ER. If your arm/hand becomes cool or blue and/or you are unable to move it, this must be treated as an emergency, go directly to the nearest ER. Look for signs of infection in the wrist: fever, red streaking of the arm, obvious pus formation and pain.      SECONDARY DISCHARGE DIAGNOSES  Diagnosis: Hyperlipidemia  Assessment and Plan of Treatment: - Your LDL is 101 mg/dL and your goal LDL is less than 70 mg/dL. LDL is also known as "bad cholesterol" because it takes cholesterol to your arteries, where it may collect in artery walls. Too much cholesterol in your arteries may lead to a buildup of plaque known as atherosclerosis and can cause heart disease. You can lower your LDL with medications and lifestyle modifications such as weight loss in overweight patients, aerobic exercise, and eating diets lower in saturated fats.   - Please continue taking Simvastatin 20mg daily and Vascepa 2G twice daily.    Diagnosis: Diabetes mellitus  Assessment and Plan of Treatment: - Your Hemoglobin A1c is 9.4. Your goal Hemoglobin A1c is less than 7.0%, This number measures your average blood sugar level over the last three months. Ways to lower this number include: diet, exercise, monitoring your fingersticks, adhering to your medication regimen and regular follow up during you Endocrinologist/Primary Care Doctor.  - You have a known history of diabetes for which you take Metformin. As you received contrast intravenously (through your IV) it can affect your kidney function and an accumulation of metformin can happen leading to HYPOGLYCEMIA. For this reason you are to HOLD metformin for 48 hours and you may RESUME IT ON 10/4/2021       PRINCIPAL DISCHARGE DIAGNOSIS  Diagnosis: CAD (coronary artery disease)  Assessment and Plan of Treatment: - You came to the hospital for a planned cardiac cath. You underwent a cardiac cath for which it was seen that you have coronary artery disease. You received 2 STENTS to the left anterior descending artery AND 2 stents to the obtuse marginal artery. You have a residual blockage in your right coronary artery for which you will need to return for another angiogram in 5 weeks. Your [facility] cardiiologist's office will coordinate this for you.  - NEVER MISS A DOSE OF ASPIRIN OR PLAVIX. IF YOU DO, YOU ARE AT RISK OF YOUR STENTS CLOSING AND HAVING A HEART ATTACK. DO NOT STOP THESE TWO MEDICATIONS UNLESS INSTRUCTED TO DO SO BY YOUR CARDIOLOGIST.   - Do NOT drive or operate hazardous machinery for 24 hours. Limit your physical activity for 24-48 hours. Do NOT engage in sports, heavy work or heavy lifting for 72 hours.   - You MAY shower BUT no TUB BATHS, HOT TUBS OR SWIMMING FOR 5 DAYS  - Your procedure was done through your right wrist. If you observe flank bleeding from the puncture site, it is an emergency. Please put direct pressure on the site and go directly to the ER. Bleeding under the skin may also occur and a small "black and blue" may be expected. If the area appears to be expanding or swelling around the puncture site, apply manual compression and go immediately to the nearest ER. If your arm/hand becomes cool or blue and/or you are unable to move it, this must be treated as an emergency, go directly to the nearest ER. Look for signs of infection in the wrist: fever, red streaking of the arm, obvious pus formation and pain.      SECONDARY DISCHARGE DIAGNOSES  Diagnosis: Hyperlipidemia  Assessment and Plan of Treatment: - Your LDL is 101 mg/dL and your goal LDL is less than 70 mg/dL. LDL is also known as "bad cholesterol" because it takes cholesterol to your arteries, where it may collect in artery walls. Too much cholesterol in your arteries may lead to a buildup of plaque known as atherosclerosis and can cause heart disease. You can lower your LDL with medications and lifestyle modifications such as weight loss in overweight patients, aerobic exercise, and eating diets lower in saturated fats.   - Please continue taking Simvastatin 20mg daily and Vascepa 2G twice daily.    Diagnosis: Diabetes mellitus  Assessment and Plan of Treatment: - Your Hemoglobin A1c is 9.4. Your goal Hemoglobin A1c is less than 7.0%, This number measures your average blood sugar level over the last three months. Ways to lower this number include: diet, exercise, monitoring your fingersticks, adhering to your medication regimen and regular follow up during you Endocrinologist/Primary Care Doctor.  - You have a known history of diabetes for which you take Metformin. As you received contrast intravenously (through your IV) it can affect your kidney function and an accumulation of metformin can happen leading to HYPOGLYCEMIA. For this reason you are to HOLD metformin for 48 hours and you may RESUME IT ON 10/4/2021       PRINCIPAL DISCHARGE DIAGNOSIS  Diagnosis: CAD (coronary artery disease)  Assessment and Plan of Treatment: - You came to the hospital for a planned cardiac cath. You underwent a cardiac cath for which it was seen that you have coronary artery disease. You received 2 STENTS to the left anterior descending artery AND 2 stents to the obtuse marginal artery. You have a residual blockage in your right coronary artery for which you will need to return for another angiogram in 5 weeks. Your [facility] cardiiologist's office will coordinate this for you.  - NEVER MISS A DOSE OF ASPIRIN OR PLAVIX. IF YOU DO, YOU ARE AT RISK OF YOUR STENTS CLOSING AND HAVING A HEART ATTACK. DO NOT STOP THESE TWO MEDICATIONS UNLESS INSTRUCTED TO DO SO BY YOUR CARDIOLOGIST.   - Do NOT drive or operate hazardous machinery for 24 hours. Limit your physical activity for 24-48 hours. Do NOT engage in sports, heavy work or heavy lifting for 72 hours.   - You MAY shower BUT no TUB BATHS, HOT TUBS OR SWIMMING FOR 5 DAYS  - Your procedure was done through your right wrist. If you observe flank bleeding from the puncture site, it is an emergency. Please put direct pressure on the site and go directly to the ER. Bleeding under the skin may also occur and a small "black and blue" may be expected. If the area appears to be expanding or swelling around the puncture site, apply manual compression and go immediately to the nearest ER. If your arm/hand becomes cool or blue and/or you are unable to move it, this must be treated as an emergency, go directly to the nearest ER. Look for signs of infection in the wrist: fever, red streaking of the arm, obvious pus formation and pain.      SECONDARY DISCHARGE DIAGNOSES  Diagnosis: Hyperlipidemia  Assessment and Plan of Treatment: - Your LDL is 101 mg/dL and your goal LDL is less than 70 mg/dL. LDL is also known as "bad cholesterol" because it takes cholesterol to your arteries, where it may collect in artery walls. Too much cholesterol in your arteries may lead to a buildup of plaque known as atherosclerosis and can cause heart disease. You can lower your LDL with medications and lifestyle modifications such as weight loss in overweight patients, aerobic exercise, and eating diets lower in saturated fats.   - Please continue taking Simvastatin 20mg daily and Vascepa 2G twice daily.    Diagnosis: Diabetes mellitus  Assessment and Plan of Treatment: - Your Hemoglobin A1c is 9.4. Your goal Hemoglobin A1c is less than 7.0%, This number measures your average blood sugar level over the last three months. Ways to lower this number include: diet, exercise, monitoring your fingersticks, adhering to your medication regimen and regular follow up during you Endocrinologist/Primary Care Doctor.  - You have a known history of diabetes for which you take Metformin. As you received contrast intravenously (through your IV) it can affect your kidney function and an accumulation of metformin can happen leading to HYPOGLYCEMIA. For this reason you are to HOLD metformin for 48 hours and you may RESUME IT ON 10/4/2021

## 2021-10-02 NOTE — DISCHARGE NOTE PROVIDER - NSDCMRMEDTOKEN_GEN_ALL_CORE_FT
Aricept 5 mg oral tablet: 1 tab(s) orally once a day (at bedtime)  Aspirin Enteric Coated 81 mg oral delayed release tablet: 1 tab(s) orally once a day  Dexilant 60 mg oral delayed release capsule: 1 cap(s) orally once a day  gabapentin enacarbil 600 mg oral tablet, extended release: 1 tab(s) orally 2 times a day  glimepiride 4 mg oral tablet: 1 tab(s) orally once a day  Imdur 60 mg oral tablet, extended release: 1 tab(s) orally once a day (in the morning)  Levemir 100 units/mL subcutaneous solution: 40 unit(s) subcutaneous once a day (at bedtime)  losartan-hydrochlorothiazide 100 mg-12.5 mg oral tablet: 1 tab(s) orally once a day  metFORMIN 1000 mg oral tablet: 1 tab(s) orally 2 times a day  NovoLOG 100 units/mL injectable solution: 4 unit(s) injectable 2 times a day (with meals)  simvastatin 20 mg oral tablet: 1 tab(s) orally once a day (at bedtime)  Vascepa 1 g oral capsule: 2 cap(s) orally 2 times a day   Aricept 5 mg oral tablet: 1 tab(s) orally once a day (at bedtime)  Aspirin Enteric Coated 81 mg oral delayed release tablet: 1 tab(s) orally once a day  Cardiac Rehab1: Cardiac Rehab for dx CAD s/p stent placement:     3 days/week x 12 weeks     Cardiologist: Dr. Troy   Dexilant 60 mg oral delayed release capsule: 1 cap(s) orally once a day  gabapentin enacarbil 600 mg oral tablet, extended release: 1 tab(s) orally 2 times a day  glimepiride 4 mg oral tablet: 1 tab(s) orally once a day  Imdur 60 mg oral tablet, extended release: 1 tab(s) orally once a day (in the morning)  Levemir 100 units/mL subcutaneous solution: 40 unit(s) subcutaneous once a day (at bedtime)  losartan-hydrochlorothiazide 100 mg-12.5 mg oral tablet: 1 tab(s) orally once a day  metFORMIN 1000 mg oral tablet: 1 tab(s) orally 2 times a day  NovoLOG 100 units/mL injectable solution: 4 unit(s) injectable 2 times a day (with meals)  simvastatin 20 mg oral tablet: 1 tab(s) orally once a day (at bedtime)  Vascepa 1 g oral capsule: 2 cap(s) orally 2 times a day   Aricept 5 mg oral tablet: 1 tab(s) orally once a day (at bedtime)  Aspirin Enteric Coated 81 mg oral delayed release tablet: 1 tab(s) orally once a day  Cardiac Rehab1: Cardiac Rehab for dx CAD s/p stent placement:     3 days/week x 12 weeks     Cardiologist: Dr. Troy   clopidogrel 75 mg oral tablet: 1 tab(s) orally once a day  Dexilant 60 mg oral delayed release capsule: 1 cap(s) orally once a day  gabapentin enacarbil 600 mg oral tablet, extended release: 1 tab(s) orally 2 times a day  glimepiride 4 mg oral tablet: 1 tab(s) orally once a day  Imdur 60 mg oral tablet, extended release: 1 tab(s) orally once a day (in the morning)  Levemir 100 units/mL subcutaneous solution: 40 unit(s) subcutaneous once a day (at bedtime)  losartan-hydrochlorothiazide 100 mg-12.5 mg oral tablet: 1 tab(s) orally once a day  metFORMIN 1000 mg oral tablet: 1 tab(s) orally 2 times a day. RESUME 10/4  NovoLOG 100 units/mL injectable solution: 4 unit(s) injectable 2 times a day (with meals)  simvastatin 20 mg oral tablet: 1 tab(s) orally once a day (at bedtime)  Vascepa 1 g oral capsule: 2 cap(s) orally 2 times a day

## 2021-10-02 NOTE — DISCHARGE NOTE PROVIDER - NSDCACTIVITY_GEN_ALL_CORE
No heavy lifting/straining Stairs allowed/Walking - Indoors allowed/No heavy lifting/straining/Walking - Outdoors allowed

## 2021-10-04 ENCOUNTER — APPOINTMENT (OUTPATIENT)
Dept: CARDIOLOGY | Facility: CLINIC | Age: 76
End: 2021-10-04

## 2021-10-07 ENCOUNTER — RESULT CHARGE (OUTPATIENT)
Age: 76
End: 2021-10-07

## 2021-10-07 ENCOUNTER — APPOINTMENT (OUTPATIENT)
Dept: CARDIOLOGY | Facility: CLINIC | Age: 76
End: 2021-10-07
Payer: MEDICARE

## 2021-10-07 VITALS
SYSTOLIC BLOOD PRESSURE: 155 MMHG | TEMPERATURE: 97.6 F | RESPIRATION RATE: 18 BRPM | HEART RATE: 81 BPM | DIASTOLIC BLOOD PRESSURE: 84 MMHG | WEIGHT: 178 LBS | OXYGEN SATURATION: 98 % | BODY MASS INDEX: 32.56 KG/M2

## 2021-10-07 DIAGNOSIS — Z79.84 LONG TERM (CURRENT) USE OF ORAL HYPOGLYCEMIC DRUGS: ICD-10-CM

## 2021-10-07 DIAGNOSIS — Z87.891 PERSONAL HISTORY OF NICOTINE DEPENDENCE: ICD-10-CM

## 2021-10-07 DIAGNOSIS — I10 ESSENTIAL (PRIMARY) HYPERTENSION: ICD-10-CM

## 2021-10-07 DIAGNOSIS — I25.10 ATHEROSCLEROTIC HEART DISEASE OF NATIVE CORONARY ARTERY WITHOUT ANGINA PECTORIS: ICD-10-CM

## 2021-10-07 DIAGNOSIS — I25.84 CORONARY ATHEROSCLEROSIS DUE TO CALCIFIED CORONARY LESION: ICD-10-CM

## 2021-10-07 DIAGNOSIS — Z85.038 PERSONAL HISTORY OF OTHER MALIGNANT NEOPLASM OF LARGE INTESTINE: ICD-10-CM

## 2021-10-07 DIAGNOSIS — Z92.21 PERSONAL HISTORY OF ANTINEOPLASTIC CHEMOTHERAPY: ICD-10-CM

## 2021-10-07 DIAGNOSIS — E78.5 HYPERLIPIDEMIA, UNSPECIFIED: ICD-10-CM

## 2021-10-07 DIAGNOSIS — E11.9 TYPE 2 DIABETES MELLITUS WITHOUT COMPLICATIONS: ICD-10-CM

## 2021-10-07 DIAGNOSIS — Z79.82 LONG TERM (CURRENT) USE OF ASPIRIN: ICD-10-CM

## 2021-10-07 DIAGNOSIS — K21.9 GASTRO-ESOPHAGEAL REFLUX DISEASE WITHOUT ESOPHAGITIS: ICD-10-CM

## 2021-10-07 DIAGNOSIS — Z98.61 CORONARY ANGIOPLASTY STATUS: ICD-10-CM

## 2021-10-07 DIAGNOSIS — I25.10 ATHEROSCLEROTIC HEART DISEASE OF NATIVE CORONARY ARTERY W/OUT ANGINA PECTORIS: ICD-10-CM

## 2021-10-07 DIAGNOSIS — Z79.4 LONG TERM (CURRENT) USE OF INSULIN: ICD-10-CM

## 2021-10-07 DIAGNOSIS — Z92.3 PERSONAL HISTORY OF IRRADIATION: ICD-10-CM

## 2021-10-07 PROCEDURE — 99495 TRANSJ CARE MGMT MOD F2F 14D: CPT

## 2021-10-07 RX ORDER — MELOXICAM 15 MG/1
15 TABLET ORAL
Qty: 30 | Refills: 0 | Status: ACTIVE | COMMUNITY
Start: 2021-09-26

## 2021-10-07 RX ORDER — BLOOD SUGAR DIAGNOSTIC
STRIP MISCELLANEOUS
Qty: 100 | Refills: 0 | Status: ACTIVE | COMMUNITY
Start: 2021-10-04

## 2021-10-07 RX ORDER — DICLOFENAC SODIUM 50 MG/1
50 TABLET, DELAYED RELEASE ORAL
Qty: 60 | Refills: 0 | Status: ACTIVE | COMMUNITY
Start: 2021-09-27

## 2021-10-07 RX ORDER — OXYBUTYNIN CHLORIDE 5 MG/1
5 TABLET, EXTENDED RELEASE ORAL
Qty: 30 | Refills: 0 | Status: ACTIVE | COMMUNITY
Start: 2021-09-30

## 2021-10-07 RX ORDER — DEXLANSOPRAZOLE 60 MG/1
60 CAPSULE, DELAYED RELEASE ORAL
Qty: 30 | Refills: 0 | Status: ACTIVE | COMMUNITY
Start: 2021-10-01

## 2021-10-07 RX ORDER — CLOPIDOGREL BISULFATE 75 MG/1
75 TABLET, FILM COATED ORAL
Qty: 30 | Refills: 0 | Status: ACTIVE | COMMUNITY
Start: 2021-10-02

## 2021-10-07 RX ORDER — BETAMETHASONE DIPROPIONATE 0.5 MG/G
0.05 CREAM TOPICAL
Qty: 45 | Refills: 0 | Status: ACTIVE | COMMUNITY
Start: 2021-10-01

## 2021-10-07 RX ORDER — METFORMIN HYDROCHLORIDE 1000 MG/1
1000 TABLET, COATED ORAL
Qty: 60 | Refills: 0 | Status: ACTIVE | COMMUNITY
Start: 2021-09-26

## 2021-10-07 RX ORDER — INSULIN ASPART 100 [IU]/ML
100 INJECTION, SOLUTION INTRAVENOUS; SUBCUTANEOUS
Qty: 30 | Refills: 0 | Status: ACTIVE | COMMUNITY
Start: 2021-10-02

## 2021-10-07 RX ORDER — PANCRELIPASE LIPASE, PANCRELIPASE PROTEASE, PANCRELIPASE AMYLASE 25000; 79000; 105000 [USP'U]/1; [USP'U]/1; [USP'U]/1
25000-79000 CAPSULE, DELAYED RELEASE ORAL
Qty: 60 | Refills: 0 | Status: ACTIVE | COMMUNITY
Start: 2021-10-04

## 2021-10-07 RX ORDER — PANCRELIPASE 36000; 180000; 114000 [USP'U]/1; [USP'U]/1; [USP'U]/1
36000-114000 CAPSULE, DELAYED RELEASE PELLETS ORAL
Qty: 60 | Refills: 0 | Status: ACTIVE | COMMUNITY
Start: 2021-10-01

## 2021-10-07 NOTE — HISTORY OF PRESENT ILLNESS
[FreeTextEntry1] :   \par 1. HTN: on medications. Controlled.\par 2. Hyperlipidemia: on statin.\par 3. DM: on glimepiride and levemir.\par 4. Due to worsening CP and SOB, the patient underwent cardiac cath that showed severe 3v CAD. She underwent PCI of the LAD and LCx. She still has an RCA lesion that will be staged.\par Her CP has improved.\par She is on ASA and plavix.\par  \par

## 2021-10-07 NOTE — DISCUSSION/SUMMARY
[FreeTextEntry1] : 76 F HTN DM hyperlipidemia for f/u after hospitalization for CAD s/p PCI.\par Continue ASA 81 and plavix 75 QD for CAD s/p PCI.\par Patient is for staged PCI.\par Continue medications for HTN and hyperlipidemia.\par

## 2021-10-07 NOTE — PHYSICAL EXAM
[Well Developed] : well developed [Well Nourished] : well nourished [No Acute Distress] : no acute distress [Normal Conjunctiva] : normal conjunctiva [Normal Venous Pressure] : normal venous pressure [No Carotid Bruit] : no carotid bruit [Normal S1, S2] : normal S1, S2 [No Rub] : no rub [No Gallop] : no gallop [Clear Lung Fields] : clear lung fields [Good Air Entry] : good air entry [No Respiratory Distress] : no respiratory distress  [Soft] : abdomen soft [Non Tender] : non-tender [No Masses/organomegaly] : no masses/organomegaly [Normal Bowel Sounds] : normal bowel sounds [Normal Gait] : normal gait [No Cyanosis] : no cyanosis [No Clubbing] : no clubbing [No Varicosities] : no varicosities [No Rash] : no rash [No Skin Lesions] : no skin lesions [Moves all extremities] : moves all extremities [No Focal Deficits] : no focal deficits [Normal Speech] : normal speech [Alert and Oriented] : alert and oriented [Normal memory] : normal memory [de-identified] : 2/6 JAMIE MCCAULEY  [de-identified] : 1+ bilateral leg edema

## 2021-10-07 NOTE — REASON FOR VISIT
[Hyperlipidemia] : hyperlipidemia [Hypertension] : hypertension [Coronary Artery Disease] : coronary artery disease [FreeTextEntry1] : 76 F HTN hyperlipidemia DM with CAD s/p PCI for f/u after hospitalization.\par

## 2021-10-13 ENCOUNTER — NON-APPOINTMENT (OUTPATIENT)
Age: 76
End: 2021-10-13

## 2021-10-13 NOTE — HISTORY OF PRESENT ILLNESS
[FreeTextEntry1] : Ms. Flores is a 76 year old woman with HTN, HL, DM who presented to her cardiologist with chest pain and shortness of breath and is now s/p PCI of LAD and Lcx and was referred to me for cardiovascular prevention and risk factor optimization\par \par Risk factors reviewed:\par HTN:\par HL\par DM:\par \par Lifestyle History:\par Mediterranean Diet Score (9 question survey) was x. \par (8-9: optimal, 6-7: near-optimal, 4-5: suboptimal, 0-3: markedly suboptimal)\par Exercise: Patient reports exercising at a moderate level for ****** minutes per week. \par Smoking: Former smoker (1 PPD x 20 years; quit 20 years ago).\par Stress: Patient denies any stress. \par \par Do you have polycystic ovarian syndrome?		\par Have you ever been pregnant?  		If so, how many times?\par Did you have any complications during pregnancy?\par Did you have any postpartum complications? \par Have you had any miscarriages? 	If so, how many? \par Have you gone through menopause? 	If yes, at what age? \par Did you receive hormone replacement?	 \par \par FH\par \par SH\par \par ROS\par \par Meds\par \par ALL\par \par Inpatient labs reviewed:\par BMI 31\par Cr 0.79\par Chol 191\par Trig 154\par HDL 59\par \par A1c 9.4%

## 2021-10-14 ENCOUNTER — NON-APPOINTMENT (OUTPATIENT)
Age: 76
End: 2021-10-14

## 2021-10-14 ENCOUNTER — APPOINTMENT (OUTPATIENT)
Dept: HEART AND VASCULAR | Facility: CLINIC | Age: 76
End: 2021-10-14

## 2021-10-21 ENCOUNTER — APPOINTMENT (OUTPATIENT)
Dept: CARDIOLOGY | Facility: CLINIC | Age: 76
End: 2021-10-21
Payer: MEDICARE

## 2021-10-21 VITALS
OXYGEN SATURATION: 97 % | DIASTOLIC BLOOD PRESSURE: 80 MMHG | HEART RATE: 83 BPM | TEMPERATURE: 97.6 F | WEIGHT: 174 LBS | RESPIRATION RATE: 18 BRPM | SYSTOLIC BLOOD PRESSURE: 137 MMHG | BODY MASS INDEX: 31.83 KG/M2

## 2021-10-21 PROCEDURE — 99214 OFFICE O/P EST MOD 30 MIN: CPT

## 2021-10-21 NOTE — DISCUSSION/SUMMARY
[FreeTextEntry1] : 76 F HTN hyperlipidemia DM CAD s/p PCI for staged PCI.\par REFER PATIENT FOR CATHETERIZATION.\par Continue ASA and plavix.\par Continue medications for HTN and hyperlipidemia.

## 2021-10-21 NOTE — REASON FOR VISIT
[facility] Division of Hospital Medicine  Najma Pollock MD  Pager (M-F, 8A-5P): [phone]  Other Times:      Patient is a 89y old  Female who presents with a chief complaint of SOB (2021 06:59)      SUBJECTIVE / OVERNIGHT EVENTS:  Feels her sob is the same as before    MEDICATIONS  (STANDING):  albuterol/ipratropium for Nebulization 3 milliLiter(s) Nebulizer every 6 hours  aMIOdarone    Tablet 200 milliGRAM(s) Oral daily  enoxaparin Injectable 40 milliGRAM(s) SubCutaneous daily  piperacillin/tazobactam IVPB.. 3.375 Gram(s) IV Intermittent every 8 hours  potassium chloride   Powder 40 milliEquivalent(s) Oral once  potassium chloride  10 mEq/100 mL IVPB 10 milliEquivalent(s) IV Intermittent every 1 hour  sodium chloride 0.9%. 1000 milliLiter(s) (75 mL/Hr) IV Continuous <Continuous>  vancomycin  IVPB 1000 milliGRAM(s) IV Intermittent daily    MEDICATIONS  (PRN):  acetaminophen   Tablet .. 650 milliGRAM(s) Oral every 6 hours PRN Temp greater or equal to 38.5C (101.3F), Mild Pain (1 - 3)  aluminum hydroxide/magnesium hydroxide/simethicone Suspension 30 milliLiter(s) Oral every 4 hours PRN Dyspepsia  melatonin 3 milliGRAM(s) Oral at bedtime PRN Insomnia  ondansetron Injectable 4 milliGRAM(s) IV Push every 8 hours PRN Nausea and/or Vomiting      CAPILLARY BLOOD GLUCOSE    I&O's Summary    PHYSICAL EXAM:  Vital Signs Last 24 Hrs  T(C): 36.7 (2021 05:00), Max: 36.9 (2021 13:41)  T(F): 98 (2021 05:00), Max: 98.5 (2021 13:41)  HR: 85 (2021 05:00) (74 - 89)  BP: 115/58 (2021 05:00) (100/45 - 137/72)  BP(mean): --  RR: 19 (2021 05:00) (19 - 20)  SpO2: 99% (2021 05:00) (99% - 100%)    CONSTITUTIONAL: NAD, well-developed, well-groomed  EYES: EOMI; conjunctiva and sclera clear  ENMT: Moist oral mucosa  RESPIRATORY: Normal respiratory effort; diminished BS on R side, + pleurex catheter   CARDIOVASCULAR: Regular rate and rhythm, normal S1 and S2, no murmur; No lower extremity edema  ABDOMEN: Nontender to palpation, normoactive bowel sounds, no rebound/guarding  MUSCULOSKELETAL:   no clubbing or cyanosis of digits; no joint swelling or tenderness to palpation  PSYCH: A+O to person, place, and time; affect appropriate  NEUROLOGY: CN 2-12 are intact and symmetric; no gross sensory deficits   SKIN: No rashes; no palpable lesions    LABS:                        8.6    28.59 )-----------( 391      ( 2021 07:08 )             27.9     07-29    132<L>  |  98  |  13  ----------------------------<  81  3.3<L>   |  20<L>  |  0.78    Ca    10.5      2021 07:08  Phos  2.7     07  Mg     1.70         TPro  5.1<L>  /  Alb  2.3<L>  /  TBili  0.4  /  DBili  x   /  AST  32  /  ALT  26  /  AlkPhos  181<H>        Urinalysis Basic - ( 2021 23:28 )    Color: Yellow / Appearance: Turbid / S.020 / pH: x  Gluc: x / Ketone: Negative  / Bili: Negative / Urobili: <2 mg/dL   Blood: x / Protein: 30 mg/dL / Nitrite: Negative   Leuk Esterase: Large / RBC: 0-2 /HPF / WBC >50 /HPF   Sq Epi: x / Non Sq Epi: Few / Bacteria: Many      Culture - Blood (collected 2021 06:41)  Source: .Blood Blood-Peripheral  Preliminary Report (2021 07:01):    No growth to date.    Culture - Blood (collected 2021 06:41)  Source: .Blood Blood-Peripheral  Preliminary Report (2021 07:01):    No growth to date.    Culture - Urine (collected 2021 23:45)  Source: Clean Catch Clean Catch (Midstream)  Final Report (2021 03:43):    >=3 organisms. Probable collection contamination.      RADIOLOGY & ADDITIONAL TESTS:  Results Reviewed:   Imaging Personally Reviewed:  Electrocardiogram Personally Reviewed:    COORDINATION OF CARE:  Care Discussed with Consultants/Other Providers [Y/N]: Y  Prior or Outpatient Records Reviewed [Y/N]: Y   [Hyperlipidemia] : hyperlipidemia [Hypertension] : hypertension [Coronary Artery Disease] : coronary artery disease [FreeTextEntry1] : 76 F HTN hyperlipidemia DM with CAD s/p PCI for f/u.\par

## 2021-10-21 NOTE — PHYSICAL EXAM
[Well Developed] : well developed [Well Nourished] : well nourished [No Acute Distress] : no acute distress [Normal Conjunctiva] : normal conjunctiva [Normal Venous Pressure] : normal venous pressure [No Carotid Bruit] : no carotid bruit [Normal S1, S2] : normal S1, S2 [No Rub] : no rub [No Gallop] : no gallop [Clear Lung Fields] : clear lung fields [Good Air Entry] : good air entry [No Respiratory Distress] : no respiratory distress  [Soft] : abdomen soft [Non Tender] : non-tender [No Masses/organomegaly] : no masses/organomegaly [Normal Bowel Sounds] : normal bowel sounds [Normal Gait] : normal gait [No Cyanosis] : no cyanosis [No Clubbing] : no clubbing [No Varicosities] : no varicosities [No Rash] : no rash [No Skin Lesions] : no skin lesions [Moves all extremities] : moves all extremities [No Focal Deficits] : no focal deficits [Normal Speech] : normal speech [Alert and Oriented] : alert and oriented [Normal memory] : normal memory [de-identified] : 2/6 JAMIE MCCAULEY  [de-identified] : 1+ bilateral leg edema

## 2021-10-21 NOTE — HISTORY OF PRESENT ILLNESS
[FreeTextEntry1] :  \par 1. HTN: on medications. Compliant with medications.\par 2. Hyperlipidemia: on statin. No muscle pain is noted.\par 3. DM: on glimepiride and levemir.\par 4. CAD s/p PCI:  the patient underwent cardiac cath that showed severe 3v CAD. She underwent PCI of the LAD and LCx. She still has an RCA lesion that will be staged.\par  \par She is on ASA and plavix.\par \par She is compliant with her medications.\par \par \par

## 2021-10-26 ENCOUNTER — APPOINTMENT (OUTPATIENT)
Dept: DISASTER EMERGENCY | Facility: CLINIC | Age: 76
End: 2021-10-26

## 2021-10-27 VITALS
DIASTOLIC BLOOD PRESSURE: 59 MMHG | HEIGHT: 62 IN | RESPIRATION RATE: 17 BRPM | TEMPERATURE: 97 F | WEIGHT: 177.91 LBS | SYSTOLIC BLOOD PRESSURE: 127 MMHG | OXYGEN SATURATION: 97 % | HEART RATE: 66 BPM

## 2021-10-27 LAB — SARS-COV-2 N GENE NPH QL NAA+PROBE: NOT DETECTED

## 2021-10-27 RX ORDER — CHLORHEXIDINE GLUCONATE 213 G/1000ML
1 SOLUTION TOPICAL ONCE
Refills: 0 | Status: DISCONTINUED | OUTPATIENT
Start: 2021-10-29 | End: 2021-10-30

## 2021-10-27 NOTE — H&P ADULT - HISTORY OF PRESENT ILLNESS
TABATHA LITTLE:  Cardiologist: Dr Troy  Pharmacy: [facility] [address] [id number]  Escort:     76 Y F Danish speaking  POOR HISTORIAN former smoker with pmh colon cancer (s/p chemo/radiation treatment 15 yrs ago), asthma (no hosp/intubation), HTN, HLD, DM II, CAD (s/p MEG oLAD, MEG mLAD, MEG x2 OM2 @[facility] 10/1/21) who initially  presented to her cardiologist c/o progressively worsening L sided chest pressure 6/10 with radiation to jaw and associated nausea, dizziness, diaphoresis, fatigue  palpitations, and SOB with exertion of 1 block occurring for 3 months.  Symptoms resolve with rest.  Pt denies orthopnea, PND, LE edema, V/D, fever, chills, sick contact, or recent travel.    Since last cath pt endorsing.....  Complaint the DAPT Aspirin 81mg QD and Plavix 75mg QD???    ECHO 9/23/21: EF 65-70%, mild diastolic dysfunction, mild MR.    In light of risk factors, CCS angina class III symptoms, and residual RCA disease pt presents for staged PCI.     cardiac catheterization 10/1/21: 3V CAD s/p CSI/MEG oLAD, CSI/MEG mLAD, MEG x2 OM2 80%. LM normal, o/pLAD 70-80% calcified, mLAD 80% calcified, OM2 85% diffuse, pRCA 75%, mRCA 80%, RPDA 75%. Staged PCI of RCA in 5 weeks. EF/EDP normal. R radial access TABATHA LITTLE:  Cardiologist: Dr Troy  Pharmacy: [facility] [address] [id number]  Escort:     76 Y F English speaking  POOR HISTORIAN former smoker with pmh colon cancer (s/p chemo/radiation treatment 15 yrs ago), asthma (no hosp/intubation), HTN, HLD, DM II, CAD (s/p MEG oLAD, MEG mLAD, MEG x2 OM2 @[facility] 10/1/21) who initially  presented to her cardiologist c/o progressively worsening L sided chest pressure 6/10 with radiation to jaw and associated nausea, dizziness, diaphoresis, fatigue  palpitations, and SOB with exertion of 1 block occurring for 3 months.  Symptoms resolve with rest.  Pt denies orthopnea, PND, LE edema, V/D, fever, chills, sick contact, or recent travel.    Since last cath pt endorsing.....  Complaint the DAPT Aspirin 81mg QD and Plavix 75mg QD???    ECHO 9/23/21: EF 65-70%, mild diastolic dysfunction, mild MR.    In light of risk factors, CCS angina class III symptoms, and residual RCA disease pt presents for staged PCI.     cardiac catheterization 10/1/21: 3V CAD s/p CSI/MEG oLAD, CSI/MEG mLAD, MEG x2 OM2 80%. LM normal, o/pLAD 70-80% calcified, mLAD 80% calcified, OM2 85% diffuse, pRCA 75%, mRCA 80%, RPDA 75%. Staged PCI of RCA in 5 weeks. EF/EDP normal. R radial access TABATHA LITTLE:  Cardiologist: Dr Troy  Pharmacy: [facility] [address] [id number]  Escort:     76 Y F Syriac speaking  POOR HISTORIAN former smoker with pmh colon cancer (s/p chemo/radiation treatment 15 yrs ago), asthma (no hosp/intubation), HTN, HLD, DM II, CAD (s/p MEG oLAD, MEG mLAD, MEG x2 OM2 @[facility] 10/1/21) who initially  presented to her cardiologist c/o progressively worsening L sided chest pressure 6/10 with radiation to jaw and associated nausea, dizziness, diaphoresis, fatigue  palpitations, and SOB with exertion of 1 block occurring for 3 months.  Symptoms resolve with rest.  Pt denies orthopnea, PND, LE edema, V/D, fever, chills, sick contact, or recent travel.    Since last cath pt endorsing.....  Complaint the DAPT Aspirin 81mg QD and Plavix 75mg QD???    ECHO 9/23/21: EF 65-70%, mild diastolic dysfunction, mild MR.    In light of risk factors, CCS angina class III symptoms, and residual RCA disease pt presents for staged PCI.     cardiac catheterization 10/1/21: 3V CAD s/p CSI/MEG oLAD, CSI/MEG mLAD, MEG x2 OM2 80%. LM normal, o/pLAD 70-80% calcified, mLAD 80% calcified, OM2 85% diffuse, pRCA 75%, mRCA 80%, RPDA 75%. Staged PCI of RCA in 5 weeks. EF/EDP normal. R radial access SKELETON     COVID: 10/26 Negative (HIE)   Cardiologist: Dr Troy  Pharmacy: [facility] [address] [place] [address]  Escort:     76 Y F Occitan speaking  POOR HISTORIAN former smoker with pmh colon cancer (s/p chemo/radiation treatment 15 yrs ago), asthma (no hosp/intubation), HTN, HLD, DM II, CAD (s/p MEG oLAD, MEG mLAD, MEG x2 OM2 @[facility] 10/1/21) who initially  presented to her cardiologist c/o progressively worsening L sided chest pressure 6/10 with radiation to jaw and associated nausea, dizziness, diaphoresis, fatigue  palpitations, and SOB with exertion of 1 block occurring for 3 months.  Symptoms resolve with rest.  Pt denies orthopnea, PND, LE edema, V/D, fever, chills, sick contact, or recent travel.    Since last cath pt endorsing.....  Complaint the DAPT Aspirin 81mg QD and Plavix 75mg QD???    ECHO 9/23/21: EF 65-70%, mild diastolic dysfunction, mild MR.    In light of risk factors, CCS angina class III symptoms, and residual RCA disease pt presents for staged PCI.     cardiac catheterization 10/1/21: 3V CAD s/p CSI/MEG oLAD, CSI/MEG mLAD, MEG x2 OM2 80%. LM normal, o/pLAD 70-80% calcified, mLAD 80% calcified, OM2 85% diffuse, pRCA 75%, mRCA 80%, RPDA 75%. Staged PCI of RCA in 5 weeks. EF/EDP normal. R radial access SKELETON     COVID: 10/26 Negative (HIE)   Cardiologist: Dr Troy  Pharmacy: [facility] [address] [place] [address]  Escort:     76 Y F Yoruba speaking  POOR HISTORIAN former smoker with pmh colon cancer (s/p chemo/radiation treatment 15 yrs ago), asthma (no hosp/intubation), HTN, HLD, DM II, CAD (s/p MEG oLAD, MEG mLAD, MEG x2 OM2 @[facility] 10/1/21) who initially  presented to her cardiologist c/o progressively worsening L sided chest pressure 6/10 with radiation to jaw and associated nausea, dizziness, diaphoresis, fatigue  palpitations, and SOB with exertion of 1 block occurring for 3 months.  Symptoms resolve with rest.  Pt denies orthopnea, PND, LE edema, V/D, fever, chills, sick contact, or recent travel.    Since last cath pt endorsing.....  Complaint the DAPT Aspirin 81mg QD and Plavix 75mg QD???    ECHO 9/23/21: EF 65-70%, mild diastolic dysfunction, mild MR.    In light of risk factors, CCS angina class III symptoms, and residual RCA disease pt presents for staged PCI.     cardiac catheterization 10/1/21: 3V CAD s/p CSI/MEG oLAD, CSI/MEG mLAD, MEG x2 OM2 80%. LM normal, o/pLAD 70-80% calcified, mLAD 80% calcified, OM2 85% diffuse, pRCA 75%, mRCA 80%, RPDA 75%. Staged PCI of RCA in 5 weeks. EF/EDP normal. R radial access SKELETON     COVID: 10/26 Negative (HIE)   Cardiologist: Dr Troy  Pharmacy: [facility] [address] [place] [address]  Escort:     76 Y F Pashto speaking  POOR HISTORIAN former smoker with pmh colon cancer (s/p chemo/radiation treatment 15 yrs ago), asthma (no hosp/intubation), HTN, HLD, DM II, CAD (s/p MEG oLAD, MEG mLAD, MEG x2 OM2 @[facility] 10/1/21) who initially  presented to her cardiologist c/o progressively worsening L sided chest pressure 6/10 with radiation to jaw and associated nausea, dizziness, diaphoresis, fatigue  palpitations, and SOB with exertion of 1 block occurring for 3 months.  Symptoms resolve with rest.  Pt denies orthopnea, PND, LE edema, V/D, fever, chills, sick contact, or recent travel.    Since last cath pt endorsing.....  Complaint the DAPT Aspirin 81mg QD and Plavix 75mg QD???    ECHO 9/23/21: EF 65-70%, mild diastolic dysfunction, mild MR.    In light of risk factors, CCS angina class III symptoms, and residual RCA disease pt presents for staged PCI.     cardiac catheterization 10/1/21: 3V CAD s/p CSI/MEG oLAD, CSI/MEG mLAD, MEG x2 OM2 80%. LM normal, o/pLAD 70-80% calcified, mLAD 80% calcified, OM2 85% diffuse, pRCA 75%, mRCA 80%, RPDA 75%. Staged PCI of RCA in 5 weeks. EF/EDP normal. R radial access COVID: 10/26 Negative (HIE)   Cardiologist: Dr Troy  Pharmacy: [facility] [address] [place] [address]  Escort: staged PCI      76 Y F Urdu speaking  POOR HISTORIAN former smoker with pmh colon cancer (s/p chemo/radiation treatment 15 yrs ago), asthma (no hosp/intubation), HTN, HLD, DM II, CAD (s/p MEG oLAD, MEG mLAD, MEG x2 OM2 @[facility] 10/1/21) who initially  presented to her cardiologist c/o progressively worsening L sided chest pressure 6/10 with radiation to jaw and associated nausea, dizziness, diaphoresis, fatigue  palpitations, and SOB with exertion of 1 block occurring for 3 months.  Symptoms resolve with rest.  Pt denies orthopnea, PND, LE edema, V/D, fever, chills, sick contact, or recent travel. Since last cath pt endorsing resolution of chest pain and SOB.  Complaint the DAPT Aspirin 81mg QD and Plavix 75mg QD.  ECHO 9/23/21: EF 65-70%, mild diastolic dysfunction, mild MR.   In light of risk factors, CCS angina class III symptoms, and residual RCA disease pt presents for staged PCI.     cardiac catheterization 10/1/21: 3V CAD s/p CSI/MEG oLAD, CSI/MEG mLAD, MEG x2 OM2 80%. LM normal, o/pLAD 70-80% calcified, mLAD 80% calcified, OM2 85% diffuse, pRCA 75%, mRCA 80%, RPDA 75%. Staged PCI of RCA in 5 weeks. EF/EDP normal. R radial access COVID: 10/26 Negative (HIE)   Cardiologist: Dr Troy  Pharmacy: [facility] [address] [place] [address]  Escort: staged PCI      76 Y F Thai speaking  POOR HISTORIAN former smoker with pmh colon cancer (s/p chemo/radiation treatment 15 yrs ago), asthma (no hosp/intubation), HTN, HLD, DM II, CAD (s/p MEG oLAD, MEG mLAD, MEG x2 OM2 @[facility] 10/1/21) who initially  presented to her cardiologist c/o progressively worsening L sided chest pressure 6/10 with radiation to jaw and associated nausea, dizziness, diaphoresis, fatigue  palpitations, and SOB with exertion of 1 block occurring for 3 months.  Symptoms resolve with rest.  Pt denies orthopnea, PND, LE edema, V/D, fever, chills, sick contact, or recent travel. Since last cath pt endorsing resolution of chest pain and SOB.  Complaint the DAPT Aspirin 81mg QD and Plavix 75mg QD.  ECHO 9/23/21: EF 65-70%, mild diastolic dysfunction, mild MR.   In light of risk factors, CCS angina class III symptoms, and residual RCA disease pt presents for staged PCI.     cardiac catheterization 10/1/21: 3V CAD s/p CSI/MEG oLAD, CSI/MEG mLAD, MEG x2 OM2 80%. LM normal, o/pLAD 70-80% calcified, mLAD 80% calcified, OM2 85% diffuse, pRCA 75%, mRCA 80%, RPDA 75%. Staged PCI of RCA in 5 weeks. EF/EDP normal. R radial access COVID: 10/26 Negative (HIE)   Cardiologist: Dr Troy  Pharmacy: [facility] [address] [place] [address]  Escort: staged PCI      76 Y F Yi speaking  POOR HISTORIAN former smoker with pmh colon cancer (s/p chemo/radiation treatment 15 yrs ago), asthma (no hosp/intubation), HTN, HLD, DM II, CAD (s/p MEG oLAD, MEG mLAD, MEG x2 OM2 @[facility] 10/1/21) who initially  presented to her cardiologist c/o progressively worsening L sided chest pressure 6/10 with radiation to jaw and associated nausea, dizziness, diaphoresis, fatigue  palpitations, and SOB with exertion of 1 block occurring for 3 months.  Symptoms resolve with rest.  Pt denies orthopnea, PND, LE edema, V/D, fever, chills, sick contact, or recent travel. Since last cath pt endorsing resolution of chest pain and SOB.  Complaint the DAPT Aspirin 81mg QD and Plavix 75mg QD.  ECHO 9/23/21: EF 65-70%, mild diastolic dysfunction, mild MR.   In light of risk factors, CCS angina class III symptoms, and residual RCA disease pt presents for staged PCI.     cardiac catheterization 10/1/21: 3V CAD s/p CSI/MEG oLAD, CSI/MEG mLAD, MEG x2 OM2 80%. LM normal, o/pLAD 70-80% calcified, mLAD 80% calcified, OM2 85% diffuse, pRCA 75%, mRCA 80%, RPDA 75%. Staged PCI of RCA in 5 weeks. EF/EDP normal. R radial access COVID: 10/26 Negative (HIE)   Cardiologist: Dr Troy  Pharmacy: [facility] [address] [place] [address]  Escort: none.  staged PCI      76 Y F Upper sorbian speaking  POOR HISTORIAN former smoker with pmh colon cancer (s/p chemo/radiation treatment 15 yrs ago), asthma (no hosp/intubation), HTN, HLD, DM II, CAD (s/p MEG oLAD, MEG mLAD, MEG x2 OM2 @[facility] 10/1/21) who initially  presented to her cardiologist c/o progressively worsening L sided chest pressure 6/10 with radiation to jaw and associated nausea, dizziness, diaphoresis, fatigue  palpitations, and SOB with exertion of 1 block occurring for 3 months.  Symptoms resolve with rest.  Pt denies orthopnea, PND, LE edema, V/D, fever, chills, sick contact, or recent travel. Since last cath pt endorsing resolution of chest pain and SOB.  Complaint the DAPT Aspirin 81mg QD and Plavix 75mg QD.  ECHO 9/23/21: EF 65-70%, mild diastolic dysfunction, mild MR.   In light of risk factors, CCS angina class III symptoms, and residual RCA disease pt presents for staged PCI.     cardiac catheterization 10/1/21: 3V CAD s/p CSI/MEG oLAD, CSI/MEG mLAD, MEG x2 OM2 80%. LM normal, o/pLAD 70-80% calcified, mLAD 80% calcified, OM2 85% diffuse, pRCA 75%, mRCA 80%, RPDA 75%. Staged PCI of RCA in 5 weeks. EF/EDP normal. R radial access COVID: 10/26 Negative (HIE)   Cardiologist: Dr Troy  Pharmacy: [facility] [address] [place] [address]  Escort: none.  staged PCI      76 Y F Greek speaking  POOR HISTORIAN former smoker with pmh colon cancer (s/p chemo/radiation treatment 15 yrs ago), asthma (no hosp/intubation), HTN, HLD, DM II, CAD (s/p MEG oLAD, MEG mLAD, MEG x2 OM2 @[facility] 10/1/21) who initially  presented to her cardiologist c/o progressively worsening L sided chest pressure 6/10 with radiation to jaw and associated nausea, dizziness, diaphoresis, fatigue  palpitations, and SOB with exertion of 1 block occurring for 3 months.  Symptoms resolve with rest.  Pt denies orthopnea, PND, LE edema, V/D, fever, chills, sick contact, or recent travel. Since last cath pt endorsing resolution of chest pain and SOB.  Complaint the DAPT Aspirin 81mg QD and Plavix 75mg QD.  ECHO 9/23/21: EF 65-70%, mild diastolic dysfunction, mild MR.   In light of risk factors, CCS angina class III symptoms, and residual RCA disease pt presents for staged PCI.     cardiac catheterization 10/1/21: 3V CAD s/p CSI/MEG oLAD, CSI/MEG mLAD, MEG x2 OM2 80%. LM normal, o/pLAD 70-80% calcified, mLAD 80% calcified, OM2 85% diffuse, pRCA 75%, mRCA 80%, RPDA 75%. Staged PCI of RCA in 5 weeks. EF/EDP normal. R radial access COVID: 10/26 Negative (HIE)   Cardiologist: Dr Troy  Pharmacy: [facility] [address] [place] [address]  Escort: none.  staged PCI      76 Y F Yakut speaking  POOR HISTORIAN former smoker with pmh colon cancer (s/p chemo/radiation treatment 15 yrs ago), asthma (no hosp/intubation), HTN, HLD, DM II, CAD (s/p MEG oLAD, MEG mLAD, MEG x2 OM2 [facility] 10/1/21) who initially  presented to her cardiologist c/o progressively worsening L sided chest pressure 6/10 with radiation to jaw and associated nausea, dizziness, diaphoresis, fatigue  palpitations, and SOB with exertion of 1 block occurring for 3 months.  Symptoms resolve with rest.  Pt denies orthopnea, PND, LE edema, V/D, fever, chills, sick contact, or recent travel. Since last cath pt endorsing resolution of chest pain and SOB.  Complaint the DAPT Aspirin 81mg QD and Plavix 75mg QD.  ECHO 9/23/21: EF 65-70%, mild diastolic dysfunction, mild MR.   In light of risk factors, CCS angina class III symptoms, and residual RCA disease pt presents for staged PCI.     cardiac catheterization 10/1/21: 3V CAD s/p CSI/MEG oLAD, CSI/MEG mLAD, MEG x2 OM2 80%. LM normal, o/pLAD 70-80% calcified, mLAD 80% calcified, OM2 85% diffuse, pRCA 75%, mRCA 80%, RPDA 75%. Staged PCI of RCA in 5 weeks. EF/EDP normal. R radial access

## 2021-10-27 NOTE — H&P ADULT - ATTENDING COMMENTS
Please see PA note for full details.  I have reviewed the case, examined the patient and agree with plan.  HTN hyperlipidemia CAD s/p staged PCI.  Continue ASA and plavix.  Patient will f/u with me.

## 2021-10-27 NOTE — H&P ADULT - ASSESSMENT
76 Y F Kiswahili speaking  POOR HISTORIAN former smoker with pmh colon cancer (s/p chemo/radiation treatment 15 yrs ago), asthma (no hosp/intubation), HTN, HLD, DM II, CAD (s/p MEG oLAD, MEG mLAD, MEG x2 OM2 @[facility] 10/1/21) who initially  presented to her cardiologist c/o progressively worsening L sided chest pressure 6/10 with radiation to jaw and associated nausea, dizziness, diaphoresis, fatigue  palpitations, and SOB with exertion of 1 block occurring for 3 months.  Symptoms resolve with rest.  Pt denies orthopnea, PND, LE edema, V/D, fever, chills, sick contact, or recent travel. Since last cath pt endorsing resolution of chest pain and SOB.  Complaint the DAPT Aspirin 81mg QD and Plavix 75mg QD.  ECHO 9/23/21: EF 65-70%, mild diastolic dysfunction, mild MR.   In light of risk factors, CCS angina class III symptoms, and residual RCA disease pt presents for staged PCI.     EKG:  NSR @ 64 bpm, TWI anterolateral leads, LVH.  Inverted P waves in lead III  				  ASA:  III			Mallampati class: III	            Anginal Class: II    Sedation Plan: Moderate   Patient Is Suitable Candidate For Sedation:  Yes       Risks & benefits of procedure and sedation and risks and benefits for the alternative therapy have been explained to the patient in layman’s terms including but not limited to: allergic reaction, bleeding, infection, arrhythmia, respiratory compromise, renal and vascular compromise, limb damage, MI, CVA, emergent CABG/Vascular Surgery and death. Informed consent obtained and in chart.    Of note:   -- Per pt, she took maintenance dose Plavix 75mg this AM.  Given maintenance dose ASA 81mg oral x 1 dose (H/H, Plts and INR WNL).    --Elevated  and CKMB 7.0 (noted chronically elevated since last admit 10/1/21).   NS @ 100cc x 4 hours for hydration  --K+ 3.6, repleted with KCL 40 mEQ x 1 dose orally   76 Y F Kinyarwanda speaking  POOR HISTORIAN former smoker with pmh colon cancer (s/p chemo/radiation treatment 15 yrs ago), asthma (no hosp/intubation), HTN, HLD, DM II, CAD (s/p MEG oLAD, MEG mLAD, MEG x2 OM2 @[facility] 10/1/21) who initially  presented to her cardiologist c/o progressively worsening L sided chest pressure 6/10 with radiation to jaw and associated nausea, dizziness, diaphoresis, fatigue  palpitations, and SOB with exertion of 1 block occurring for 3 months.  Symptoms resolve with rest.  Pt denies orthopnea, PND, LE edema, V/D, fever, chills, sick contact, or recent travel. Since last cath pt endorsing resolution of chest pain and SOB.  Complaint the DAPT Aspirin 81mg QD and Plavix 75mg QD.  ECHO 9/23/21: EF 65-70%, mild diastolic dysfunction, mild MR.   In light of risk factors, CCS angina class III symptoms, and residual RCA disease pt presents for staged PCI.     EKG:  NSR @ 64 bpm, TWI anterolateral leads, LVH.  Inverted P waves in lead III  				  ASA:  III			Mallampati class: III	            Anginal Class: II    Sedation Plan: Moderate   Patient Is Suitable Candidate For Sedation:  Yes       Risks & benefits of procedure and sedation and risks and benefits for the alternative therapy have been explained to the patient in layman’s terms including but not limited to: allergic reaction, bleeding, infection, arrhythmia, respiratory compromise, renal and vascular compromise, limb damage, MI, CVA, emergent CABG/Vascular Surgery and death. Informed consent obtained and in chart.    Of note:   -- Per pt, she took maintenance dose Plavix 75mg this AM.  Given maintenance dose ASA 81mg oral x 1 dose (H/H, Plts and INR WNL).    --Elevated  and CKMB 7.0 (noted chronically elevated since last admit 10/1/21).   NS @ 100cc x 4 hours for hydration  --K+ 3.6, repleted with KCL 40 mEQ x 1 dose orally   76 Y F Sami speaking  POOR HISTORIAN former smoker with pmh colon cancer (s/p chemo/radiation treatment 15 yrs ago), asthma (no hosp/intubation), HTN, HLD, DM II, CAD (s/p MEG oLAD, MEG mLAD, MEG x2 OM2 @[facility] 10/1/21) who initially  presented to her cardiologist c/o progressively worsening L sided chest pressure 6/10 with radiation to jaw and associated nausea, dizziness, diaphoresis, fatigue  palpitations, and SOB with exertion of 1 block occurring for 3 months.  Symptoms resolve with rest.  Pt denies orthopnea, PND, LE edema, V/D, fever, chills, sick contact, or recent travel. Since last cath pt endorsing resolution of chest pain and SOB.  Complaint the DAPT Aspirin 81mg QD and Plavix 75mg QD.  ECHO 9/23/21: EF 65-70%, mild diastolic dysfunction, mild MR.   In light of risk factors, CCS angina class III symptoms, and residual RCA disease pt presents for staged PCI.     EKG:  NSR @ 64 bpm, TWI anterolateral leads, LVH.  Inverted P waves in lead III  				  ASA:  III			Mallampati class: III	            Anginal Class: II    Sedation Plan: Moderate   Patient Is Suitable Candidate For Sedation:  Yes       Risks & benefits of procedure and sedation and risks and benefits for the alternative therapy have been explained to the patient in layman’s terms including but not limited to: allergic reaction, bleeding, infection, arrhythmia, respiratory compromise, renal and vascular compromise, limb damage, MI, CVA, emergent CABG/Vascular Surgery and death. Informed consent obtained and in chart.    Of note:   -- Per pt, she took maintenance dose Plavix 75mg this AM.  Given maintenance dose ASA 81mg oral x 1 dose (H/H, Plts and INR WNL).    --Elevated  and CKMB 7.0 (noted chronically elevated since last admit 10/1/21).   NS @ 100cc x 4 hours for hydration  --K+ 3.6, repleted with KCL 40 mEQ x 1 dose orally   76 Y F Slovak speaking  POOR HISTORIAN former smoker with pmh colon cancer (s/p chemo/radiation treatment 15 yrs ago), asthma (no hosp/intubation), HTN, HLD, DM II, CAD (s/p MEG oLAD, MEG mLAD, MEG x2 OM2 @[facility] 10/1/21) who initially  presented to her cardiologist c/o progressively worsening L sided chest pressure 6/10 with radiation to jaw and associated nausea, dizziness, diaphoresis, fatigue  palpitations, and SOB with exertion of 1 block occurring for 3 months.  Symptoms resolve with rest.  Pt denies orthopnea, PND, LE edema, V/D, fever, chills, sick contact, or recent travel. Since last cath pt endorsing resolution of chest pain and SOB.  Complaint the DAPT Aspirin 81mg QD and Plavix 75mg QD.  ECHO 9/23/21: EF 65-70%, mild diastolic dysfunction, mild MR.   In light of risk factors, CCS angina class III symptoms, and residual RCA disease pt presents for staged PCI.     EKG:  NSR @ 64 bpm, TWI anterolateral leads, LVH.  Inverted P waves in lead III  				  ASA:  III			Mallampati class: III	            Anginal Class: II    Sedation Plan: Moderate   Patient Is Suitable Candidate For Sedation:  Yes       Risks & benefits of procedure and sedation and risks and benefits for the alternative therapy have been explained to the patient in layman’s terms including but not limited to: allergic reaction, bleeding, infection, arrhythmia, respiratory compromise, renal and vascular compromise, limb damage, MI, CVA, emergent CABG/Vascular Surgery and death. Informed consent obtained and in chart.    Of note:   --Per pt, she took maintenance dose Plavix 75mg this AM.  Given maintenance dose ASA 81mg oral x 1 dose (H/H, Plts and INR WNL).    --Elevated  and CKMB 7.0 (noted chronically elevated since last admit 10/1/21).   NS @ 100cc x 4 hours for hydration  --K+ 3.6, repleted with KCL 40 mEQ x 1 dose orally  --Elevated , pt currently on Simvastatin 20mg QHS, consider switching to Atorva post procedure if Dr Troy allows.    76 Y F Danish speaking  POOR HISTORIAN former smoker with pmh colon cancer (s/p chemo/radiation treatment 15 yrs ago), asthma (no hosp/intubation), HTN, HLD, DM II, CAD (s/p MEG oLAD, MEG mLAD, MEG x2 OM2 @[facility] 10/1/21) who initially  presented to her cardiologist c/o progressively worsening L sided chest pressure 6/10 with radiation to jaw and associated nausea, dizziness, diaphoresis, fatigue  palpitations, and SOB with exertion of 1 block occurring for 3 months.  Symptoms resolve with rest.  Pt denies orthopnea, PND, LE edema, V/D, fever, chills, sick contact, or recent travel. Since last cath pt endorsing resolution of chest pain and SOB.  Complaint the DAPT Aspirin 81mg QD and Plavix 75mg QD.  ECHO 9/23/21: EF 65-70%, mild diastolic dysfunction, mild MR.   In light of risk factors, CCS angina class III symptoms, and residual RCA disease pt presents for staged PCI.     EKG:  NSR @ 64 bpm, TWI anterolateral leads, LVH.  Inverted P waves in lead III  				  ASA:  III			Mallampati class: III	            Anginal Class: II    Sedation Plan: Moderate   Patient Is Suitable Candidate For Sedation:  Yes       Risks & benefits of procedure and sedation and risks and benefits for the alternative therapy have been explained to the patient in layman’s terms including but not limited to: allergic reaction, bleeding, infection, arrhythmia, respiratory compromise, renal and vascular compromise, limb damage, MI, CVA, emergent CABG/Vascular Surgery and death. Informed consent obtained and in chart.    Of note:   --Per pt, she took maintenance dose Plavix 75mg this AM.  Given maintenance dose ASA 81mg oral x 1 dose (H/H, Plts and INR WNL).    --Elevated  and CKMB 7.0 (noted chronically elevated since last admit 10/1/21).   NS @ 100cc x 4 hours for hydration  --K+ 3.6, repleted with KCL 40 mEQ x 1 dose orally  --Elevated , pt currently on Simvastatin 20mg QHS, consider switching to Atorva post procedure if Dr Troy allows.    76 Y F Uzbek speaking  POOR HISTORIAN former smoker with pmh colon cancer (s/p chemo/radiation treatment 15 yrs ago), asthma (no hosp/intubation), HTN, HLD, DM II, CAD (s/p MEG oLAD, MEG mLAD, [facility] who initially  presented to her cardiologist c/o progressively worsening L sided chest pressure 6/10 with radiation to jaw and associated nausea, dizziness, diaphoresis, fatigue  palpitations, and SOB with exertion of 1 block occurring for 3 months.  Symptoms resolve with rest.  Pt denies orthopnea, PND, LE edema, V/D, fever, chills, sick contact, or recent travel. Since last cath pt endorsing resolution of chest pain and SOB.  Complaint the DAPT Aspirin 81mg QD and Plavix 75mg QD.  ECHO 9/23/21: EF 65-70%, mild diastolic dysfunction, mild MR.   In light of risk factors, CCS angina class III symptoms, and residual RCA disease pt presents for staged PCI.     EKG:  NSR @ 64 bpm, TWI anterolateral leads, LVH.  Inverted P waves in lead III  				  ASA:  III			Mallampati class: III	            Anginal Class: II    Sedation Plan: Moderate   Patient Is Suitable Candidate For Sedation:  Yes       Risks & benefits of procedure and sedation and risks and benefits for the alternative therapy have been explained to the patient in layman’s terms including but not limited to: allergic reaction, bleeding, infection, arrhythmia, respiratory compromise, renal and vascular compromise, limb damage, MI, CVA, emergent CABG/Vascular Surgery and death. Informed consent obtained and in chart.    Of note:   --Per pt, she took maintenance dose Plavix 75mg this AM.  Given maintenance dose ASA 81mg oral x 1 dose (H/H, Plts and INR WNL).    --Elevated  and CKMB 7.0 (noted chronically elevated since last admit 10/1/21).   NS @ 100cc x 4 hours for hydration  --K+ 3.6, repleted with KCL 40 mEQ x 1 dose orally  --Elevated , pt currently on Simvastatin 20mg QHS, consider switching to Atorva post procedure if Dr Troy allows.

## 2021-10-27 NOTE — H&P ADULT - NSICDXPASTMEDICALHX_GEN_ALL_CORE_FT
PAST MEDICAL HISTORY:  CAD (coronary artery disease)     Colon cancer s/p chemo/radiation 15 yrs ago    HLD (hyperlipidemia)     HTN (hypertension)     Type 2 diabetes mellitus

## 2021-10-27 NOTE — H&P ADULT - NSHPLABSRESULTS_GEN_ALL_CORE
13.9   5.92  )-----------( 216      ( 29 Oct 2021 10:41 )             43.4       10-29    140  |  102  |  28<H>  ----------------------------<  165<H>  3.6   |  30  |  0.75    Ca    10.2      29 Oct 2021 10:41    TPro  7.7  /  Alb  5.0  /  TBili  0.7  /  DBili  x   /  AST  23  /  ALT  25  /  AlkPhos  66  10-29      PT/INR - ( 29 Oct 2021 10:41 )   PT: 11.1 sec;   INR: 0.92          PTT - ( 29 Oct 2021 10:41 )  PTT:32.4 sec    CARDIAC MARKERS ( 29 Oct 2021 10:41 )  x     / x     / 214 U/L / x     / 7.0 ng/mL            EKG: 13.9   5.92  )-----------( 216      ( 29 Oct 2021 10:41 )             43.4     10-29    140  |  102  |  28<H>  ----------------------------<  165<H>  3.6   |  30  |  0.75    Ca    10.2      29 Oct 2021 10:41    TPro  7.7  /  Alb  5.0  /  TBili  0.7  /  DBili  x   /  AST  23  /  ALT  25  /  AlkPhos  66  10-29      PT/INR - ( 29 Oct 2021 10:41 )   PT: 11.1 sec;   INR: 0.92          PTT - ( 29 Oct 2021 10:41 )  PTT:32.4 sec    CARDIAC MARKERS ( 29 Oct 2021 10:41 )  x     / x     / 214 U/L / x     / 7.0 ng/mL

## 2021-10-29 ENCOUNTER — TRANSCRIPTION ENCOUNTER (OUTPATIENT)
Age: 76
End: 2021-10-29

## 2021-10-29 ENCOUNTER — INPATIENT (INPATIENT)
Facility: HOSPITAL | Age: 76
LOS: 0 days | Discharge: HOME | DRG: 247 | End: 2021-10-30
Attending: INTERNAL MEDICINE | Admitting: INTERNAL MEDICINE
Payer: MEDICARE

## 2021-10-29 DIAGNOSIS — I25.84 CORONARY ATHEROSCLEROSIS DUE TO CALCIFIED CORONARY LESION: ICD-10-CM

## 2021-10-29 DIAGNOSIS — J45.909 UNSPECIFIED ASTHMA, UNCOMPLICATED: ICD-10-CM

## 2021-10-29 DIAGNOSIS — I25.10 ATHEROSCLEROTIC HEART DISEASE OF NATIVE CORONARY ARTERY WITHOUT ANGINA PECTORIS: ICD-10-CM

## 2021-10-29 DIAGNOSIS — Z98.49 CATARACT EXTRACTION STATUS, UNSPECIFIED EYE: Chronic | ICD-10-CM

## 2021-10-29 DIAGNOSIS — Z87.891 PERSONAL HISTORY OF NICOTINE DEPENDENCE: ICD-10-CM

## 2021-10-29 DIAGNOSIS — Z79.4 LONG TERM (CURRENT) USE OF INSULIN: ICD-10-CM

## 2021-10-29 DIAGNOSIS — Z98.890 OTHER SPECIFIED POSTPROCEDURAL STATES: Chronic | ICD-10-CM

## 2021-10-29 DIAGNOSIS — Z98.49 CATARACT EXTRACTION STATUS, UNSPECIFIED EYE: ICD-10-CM

## 2021-10-29 DIAGNOSIS — E78.5 HYPERLIPIDEMIA, UNSPECIFIED: ICD-10-CM

## 2021-10-29 DIAGNOSIS — Z79.02 LONG TERM (CURRENT) USE OF ANTITHROMBOTICS/ANTIPLATELETS: ICD-10-CM

## 2021-10-29 DIAGNOSIS — I10 ESSENTIAL (PRIMARY) HYPERTENSION: ICD-10-CM

## 2021-10-29 DIAGNOSIS — Z79.82 LONG TERM (CURRENT) USE OF ASPIRIN: ICD-10-CM

## 2021-10-29 DIAGNOSIS — Z92.3 PERSONAL HISTORY OF IRRADIATION: ICD-10-CM

## 2021-10-29 DIAGNOSIS — Z83.3 FAMILY HISTORY OF DIABETES MELLITUS: ICD-10-CM

## 2021-10-29 DIAGNOSIS — E11.9 TYPE 2 DIABETES MELLITUS WITHOUT COMPLICATIONS: ICD-10-CM

## 2021-10-29 DIAGNOSIS — Z92.21 PERSONAL HISTORY OF ANTINEOPLASTIC CHEMOTHERAPY: ICD-10-CM

## 2021-10-29 DIAGNOSIS — Z95.5 PRESENCE OF CORONARY ANGIOPLASTY IMPLANT AND GRAFT: ICD-10-CM

## 2021-10-29 DIAGNOSIS — Z85.038 PERSONAL HISTORY OF OTHER MALIGNANT NEOPLASM OF LARGE INTESTINE: ICD-10-CM

## 2021-10-29 LAB
A1C WITH ESTIMATED AVERAGE GLUCOSE RESULT: 9.3 % — HIGH (ref 4–5.6)
ALBUMIN SERPL ELPH-MCNC: 5 G/DL — SIGNIFICANT CHANGE UP (ref 3.3–5)
ALP SERPL-CCNC: 66 U/L — SIGNIFICANT CHANGE UP (ref 40–120)
ALT FLD-CCNC: 25 U/L — SIGNIFICANT CHANGE UP (ref 10–45)
ANION GAP SERPL CALC-SCNC: 8 MMOL/L — SIGNIFICANT CHANGE UP (ref 5–17)
APTT BLD: 32.4 SEC — SIGNIFICANT CHANGE UP (ref 27.5–35.5)
AST SERPL-CCNC: 23 U/L — SIGNIFICANT CHANGE UP (ref 10–40)
BASOPHILS # BLD AUTO: 0.05 K/UL — SIGNIFICANT CHANGE UP (ref 0–0.2)
BASOPHILS NFR BLD AUTO: 0.8 % — SIGNIFICANT CHANGE UP (ref 0–2)
BILIRUB SERPL-MCNC: 0.7 MG/DL — SIGNIFICANT CHANGE UP (ref 0.2–1.2)
BUN SERPL-MCNC: 28 MG/DL — HIGH (ref 7–23)
CALCIUM SERPL-MCNC: 10.2 MG/DL — SIGNIFICANT CHANGE UP (ref 8.4–10.5)
CHLORIDE SERPL-SCNC: 102 MMOL/L — SIGNIFICANT CHANGE UP (ref 96–108)
CHOLEST SERPL-MCNC: 211 MG/DL — HIGH
CK MB CFR SERPL CALC: 7 NG/ML — HIGH (ref 0–6.7)
CK SERPL-CCNC: 214 U/L — HIGH (ref 25–170)
CO2 SERPL-SCNC: 30 MMOL/L — SIGNIFICANT CHANGE UP (ref 22–31)
CREAT SERPL-MCNC: 0.75 MG/DL — SIGNIFICANT CHANGE UP (ref 0.5–1.3)
EOSINOPHIL # BLD AUTO: 0.16 K/UL — SIGNIFICANT CHANGE UP (ref 0–0.5)
EOSINOPHIL NFR BLD AUTO: 2.7 % — SIGNIFICANT CHANGE UP (ref 0–6)
ESTIMATED AVERAGE GLUCOSE: 220 MG/DL — HIGH (ref 68–114)
GLUCOSE BLDC GLUCOMTR-MCNC: 121 MG/DL — HIGH (ref 70–99)
GLUCOSE BLDC GLUCOMTR-MCNC: 146 MG/DL — HIGH (ref 70–99)
GLUCOSE BLDC GLUCOMTR-MCNC: 203 MG/DL — HIGH (ref 70–99)
GLUCOSE SERPL-MCNC: 165 MG/DL — HIGH (ref 70–99)
HCT VFR BLD CALC: 43.4 % — SIGNIFICANT CHANGE UP (ref 34.5–45)
HDLC SERPL-MCNC: 62 MG/DL — SIGNIFICANT CHANGE UP
HGB BLD-MCNC: 13.9 G/DL — SIGNIFICANT CHANGE UP (ref 11.5–15.5)
IMM GRANULOCYTES NFR BLD AUTO: 0.3 % — SIGNIFICANT CHANGE UP (ref 0–1.5)
INR BLD: 0.92 — SIGNIFICANT CHANGE UP (ref 0.88–1.16)
LIPID PNL WITH DIRECT LDL SERPL: 123 MG/DL — HIGH
LYMPHOCYTES # BLD AUTO: 1.58 K/UL — SIGNIFICANT CHANGE UP (ref 1–3.3)
LYMPHOCYTES # BLD AUTO: 26.7 % — SIGNIFICANT CHANGE UP (ref 13–44)
MCHC RBC-ENTMCNC: 28.2 PG — SIGNIFICANT CHANGE UP (ref 27–34)
MCHC RBC-ENTMCNC: 32 GM/DL — SIGNIFICANT CHANGE UP (ref 32–36)
MCV RBC AUTO: 88 FL — SIGNIFICANT CHANGE UP (ref 80–100)
MONOCYTES # BLD AUTO: 0.32 K/UL — SIGNIFICANT CHANGE UP (ref 0–0.9)
MONOCYTES NFR BLD AUTO: 5.4 % — SIGNIFICANT CHANGE UP (ref 2–14)
NEUTROPHILS # BLD AUTO: 3.79 K/UL — SIGNIFICANT CHANGE UP (ref 1.8–7.4)
NEUTROPHILS NFR BLD AUTO: 64.1 % — SIGNIFICANT CHANGE UP (ref 43–77)
NON HDL CHOLESTEROL: 149 MG/DL — HIGH
NRBC # BLD: 0 /100 WBCS — SIGNIFICANT CHANGE UP (ref 0–0)
PLATELET # BLD AUTO: 216 K/UL — SIGNIFICANT CHANGE UP (ref 150–400)
POTASSIUM SERPL-MCNC: 3.6 MMOL/L — SIGNIFICANT CHANGE UP (ref 3.5–5.3)
POTASSIUM SERPL-SCNC: 3.6 MMOL/L — SIGNIFICANT CHANGE UP (ref 3.5–5.3)
PROT SERPL-MCNC: 7.7 G/DL — SIGNIFICANT CHANGE UP (ref 6–8.3)
PROTHROM AB SERPL-ACNC: 11.1 SEC — SIGNIFICANT CHANGE UP (ref 10.6–13.6)
RBC # BLD: 4.93 M/UL — SIGNIFICANT CHANGE UP (ref 3.8–5.2)
RBC # FLD: 13.2 % — SIGNIFICANT CHANGE UP (ref 10.3–14.5)
SODIUM SERPL-SCNC: 140 MMOL/L — SIGNIFICANT CHANGE UP (ref 135–145)
TRIGL SERPL-MCNC: 130 MG/DL — SIGNIFICANT CHANGE UP
WBC # BLD: 5.92 K/UL — SIGNIFICANT CHANGE UP (ref 3.8–10.5)
WBC # FLD AUTO: 5.92 K/UL — SIGNIFICANT CHANGE UP (ref 3.8–10.5)

## 2021-10-29 PROCEDURE — 92933 PRQ TRLML C ATHRC ST ANGIOP1: CPT | Mod: RC,59

## 2021-10-29 PROCEDURE — 99222 1ST HOSP IP/OBS MODERATE 55: CPT

## 2021-10-29 PROCEDURE — 99152 MOD SED SAME PHYS/QHP 5/>YRS: CPT

## 2021-10-29 PROCEDURE — 93454 CORONARY ARTERY ANGIO S&I: CPT | Mod: 26,59

## 2021-10-29 RX ORDER — SIMVASTATIN 20 MG/1
20 TABLET, FILM COATED ORAL AT BEDTIME
Refills: 0 | Status: DISCONTINUED | OUTPATIENT
Start: 2021-10-29 | End: 2021-10-30

## 2021-10-29 RX ORDER — SODIUM CHLORIDE 9 MG/ML
1000 INJECTION, SOLUTION INTRAVENOUS
Refills: 0 | Status: DISCONTINUED | OUTPATIENT
Start: 2021-10-29 | End: 2021-10-30

## 2021-10-29 RX ORDER — GLUCAGON INJECTION, SOLUTION 0.5 MG/.1ML
1 INJECTION, SOLUTION SUBCUTANEOUS ONCE
Refills: 0 | Status: DISCONTINUED | OUTPATIENT
Start: 2021-10-29 | End: 2021-10-30

## 2021-10-29 RX ORDER — INSULIN LISPRO 100/ML
VIAL (ML) SUBCUTANEOUS ONCE
Refills: 0 | Status: DISCONTINUED | OUTPATIENT
Start: 2021-10-29 | End: 2021-10-29

## 2021-10-29 RX ORDER — ASPIRIN/CALCIUM CARB/MAGNESIUM 324 MG
81 TABLET ORAL DAILY
Refills: 0 | Status: DISCONTINUED | OUTPATIENT
Start: 2021-10-30 | End: 2021-10-30

## 2021-10-29 RX ORDER — DEXTROSE 50 % IN WATER 50 %
15 SYRINGE (ML) INTRAVENOUS ONCE
Refills: 0 | Status: DISCONTINUED | OUTPATIENT
Start: 2021-10-29 | End: 2021-10-29

## 2021-10-29 RX ORDER — SODIUM CHLORIDE 9 MG/ML
500 INJECTION INTRAMUSCULAR; INTRAVENOUS; SUBCUTANEOUS
Refills: 0 | Status: DISCONTINUED | OUTPATIENT
Start: 2021-10-29 | End: 2021-10-29

## 2021-10-29 RX ORDER — DEXTROSE 50 % IN WATER 50 %
25 SYRINGE (ML) INTRAVENOUS ONCE
Refills: 0 | Status: DISCONTINUED | OUTPATIENT
Start: 2021-10-29 | End: 2021-10-30

## 2021-10-29 RX ORDER — SODIUM CHLORIDE 9 MG/ML
1000 INJECTION, SOLUTION INTRAVENOUS
Refills: 0 | Status: DISCONTINUED | OUTPATIENT
Start: 2021-10-29 | End: 2021-10-29

## 2021-10-29 RX ORDER — DEXTROSE 50 % IN WATER 50 %
12.5 SYRINGE (ML) INTRAVENOUS ONCE
Refills: 0 | Status: DISCONTINUED | OUTPATIENT
Start: 2021-10-29 | End: 2021-10-29

## 2021-10-29 RX ORDER — DEXTROSE 50 % IN WATER 50 %
12.5 SYRINGE (ML) INTRAVENOUS ONCE
Refills: 0 | Status: DISCONTINUED | OUTPATIENT
Start: 2021-10-29 | End: 2021-10-30

## 2021-10-29 RX ORDER — LOSARTAN POTASSIUM 100 MG/1
100 TABLET, FILM COATED ORAL DAILY
Refills: 0 | Status: DISCONTINUED | OUTPATIENT
Start: 2021-10-30 | End: 2021-10-30

## 2021-10-29 RX ORDER — DONEPEZIL HYDROCHLORIDE 10 MG/1
5 TABLET, FILM COATED ORAL AT BEDTIME
Refills: 0 | Status: DISCONTINUED | OUTPATIENT
Start: 2021-10-29 | End: 2021-10-30

## 2021-10-29 RX ORDER — PANTOPRAZOLE SODIUM 20 MG/1
40 TABLET, DELAYED RELEASE ORAL
Refills: 0 | Status: DISCONTINUED | OUTPATIENT
Start: 2021-10-29 | End: 2021-10-30

## 2021-10-29 RX ORDER — GLUCAGON INJECTION, SOLUTION 0.5 MG/.1ML
1 INJECTION, SOLUTION SUBCUTANEOUS ONCE
Refills: 0 | Status: DISCONTINUED | OUTPATIENT
Start: 2021-10-29 | End: 2021-10-29

## 2021-10-29 RX ORDER — SODIUM CHLORIDE 9 MG/ML
500 INJECTION INTRAMUSCULAR; INTRAVENOUS; SUBCUTANEOUS
Refills: 0 | Status: DISCONTINUED | OUTPATIENT
Start: 2021-10-29 | End: 2021-10-30

## 2021-10-29 RX ORDER — DEXTROSE 50 % IN WATER 50 %
15 SYRINGE (ML) INTRAVENOUS ONCE
Refills: 0 | Status: DISCONTINUED | OUTPATIENT
Start: 2021-10-29 | End: 2021-10-30

## 2021-10-29 RX ORDER — CLOPIDOGREL BISULFATE 75 MG/1
75 TABLET, FILM COATED ORAL DAILY
Refills: 0 | Status: DISCONTINUED | OUTPATIENT
Start: 2021-10-30 | End: 2021-10-30

## 2021-10-29 RX ORDER — ASPIRIN/CALCIUM CARB/MAGNESIUM 324 MG
81 TABLET ORAL ONCE
Refills: 0 | Status: COMPLETED | OUTPATIENT
Start: 2021-10-29 | End: 2021-10-29

## 2021-10-29 RX ORDER — INSULIN GLARGINE 100 [IU]/ML
20 INJECTION, SOLUTION SUBCUTANEOUS AT BEDTIME
Refills: 0 | Status: DISCONTINUED | OUTPATIENT
Start: 2021-10-29 | End: 2021-10-30

## 2021-10-29 RX ORDER — DEXTROSE 50 % IN WATER 50 %
25 SYRINGE (ML) INTRAVENOUS ONCE
Refills: 0 | Status: DISCONTINUED | OUTPATIENT
Start: 2021-10-29 | End: 2021-10-29

## 2021-10-29 RX ORDER — INSULIN LISPRO 100/ML
VIAL (ML) SUBCUTANEOUS
Refills: 0 | Status: DISCONTINUED | OUTPATIENT
Start: 2021-10-29 | End: 2021-10-30

## 2021-10-29 RX ORDER — POTASSIUM CHLORIDE 20 MEQ
40 PACKET (EA) ORAL ONCE
Refills: 0 | Status: COMPLETED | OUTPATIENT
Start: 2021-10-29 | End: 2021-10-29

## 2021-10-29 RX ORDER — GABAPENTIN 400 MG/1
600 CAPSULE ORAL
Refills: 0 | Status: DISCONTINUED | OUTPATIENT
Start: 2021-10-29 | End: 2021-10-30

## 2021-10-29 RX ORDER — ISOSORBIDE MONONITRATE 60 MG/1
60 TABLET, EXTENDED RELEASE ORAL DAILY
Refills: 0 | Status: DISCONTINUED | OUTPATIENT
Start: 2021-10-29 | End: 2021-10-30

## 2021-10-29 RX ADMIN — Medication 40 MILLIEQUIVALENT(S): at 12:35

## 2021-10-29 RX ADMIN — Medication 81 MILLIGRAM(S): at 12:35

## 2021-10-29 RX ADMIN — DONEPEZIL HYDROCHLORIDE 5 MILLIGRAM(S): 10 TABLET, FILM COATED ORAL at 21:09

## 2021-10-29 RX ADMIN — SIMVASTATIN 20 MILLIGRAM(S): 20 TABLET, FILM COATED ORAL at 21:09

## 2021-10-29 RX ADMIN — INSULIN GLARGINE 20 UNIT(S): 100 INJECTION, SOLUTION SUBCUTANEOUS at 21:30

## 2021-10-29 RX ADMIN — Medication 4: at 21:15

## 2021-10-29 RX ADMIN — ISOSORBIDE MONONITRATE 60 MILLIGRAM(S): 60 TABLET, EXTENDED RELEASE ORAL at 21:10

## 2021-10-29 RX ADMIN — SODIUM CHLORIDE 100 MILLILITER(S): 9 INJECTION INTRAMUSCULAR; INTRAVENOUS; SUBCUTANEOUS at 12:35

## 2021-10-29 RX ADMIN — GABAPENTIN 600 MILLIGRAM(S): 400 CAPSULE ORAL at 19:23

## 2021-10-29 RX ADMIN — SODIUM CHLORIDE 100 MILLILITER(S): 9 INJECTION INTRAMUSCULAR; INTRAVENOUS; SUBCUTANEOUS at 16:55

## 2021-10-29 NOTE — DISCHARGE NOTE PROVIDER - CARE PROVIDER_API CALL
Ritchie Troy)  Cardiovascular Disease; Internal Medicine  [address] [place] [address]  Phone: [phone]  Fax: [phone]  Established Patient  Follow Up Time: 2 weeks   Ritchie Troy)  Cardiovascular Disease; Internal Medicine  [address] [place] [address]  Phone: [phone]  Fax: [phone]  Established Patient  Follow Up Time: 2 weeks   Ritchie Troy)  Cardiovascular Disease; Internal Medicine  [address] [place] [address]  Phone: [phone]  Fax: [phone]  Established Patient  Follow Up Time: 2 weeks   Ritchie Troy)  Cardiovascular Disease; Internal Medicine  [address] [place] [address]  Phone: [phone]  Fax: [phone]  Established Patient  Scheduled Appointment: 11/08/2021 12:00 PM   Ritchie Troy)  Cardiovascular Disease; Internal Medicine  [address] [place] [address]  Phone: [phone]  Fax: [phone]  Established Patient  Scheduled Appointment: 11/08/2021 12:00 PM   Ritchie Troy)  Cardiovascular Disease; Internal Medicine  [address] [place] [address]  Phone: [phone]  Fax: [phone]  Established Patient  Scheduled Appointment: 11/08/2021 12:00 PM

## 2021-10-29 NOTE — PATIENT PROFILE ADULT - ARRIVAL FROM
[facility] CATH LAB/In house [facility] CATH LAB/In house [facility] CATH LAB/In house

## 2021-10-29 NOTE — DISCHARGE NOTE PROVIDER - PROVIDER TOKENS
PROVIDER:[TOKEN:[[id number]],FOLLOWUP:[2 weeks],ESTABLISHEDPATIENT:[T]] PROVIDER:[TOKEN:[[id number]],SCHEDULEDAPPT:[11/08/2021],SCHEDULEDAPPTTIME:[12:00 PM],ESTABLISHEDPATIENT:[T]]

## 2021-10-29 NOTE — DISCHARGE NOTE PROVIDER - NSDCCPCAREPLAN_GEN_ALL_CORE_FT
PRINCIPAL DISCHARGE DIAGNOSIS  Diagnosis: CAD (coronary artery disease)  Assessment and Plan of Treatment: You underwent a cardiac catheterization and the blockage/narrowing due to plaque build-up in your RPLS artery was opened using a stent. You should continue taking aspirin 81mg daily and Plavix 75 mg daily.  NEVER MISS A DOSE OF ASPIRIN OR PLAVIX; IF YOU DO, YOU ARE AT RISK OF YOUR STENT CLOSING AND HAVING A HEART ATTACK. DO NOT STOP THESE TWO MEDICATIONS UNLESS INSTRUCTED TO DO SO BY YOUR CARDIOLOGIST.  Aspirin and Plavix can put you at increased risk of bleeding; please avoid NSAIDS (such as Motrin, Advil, Ibuprofen, Naproxen, or Aleve, as these medications may further your risk of bleeding.  -Your procedure was done through your groin   -You do not need to keep this area covered and you may shower  -Please avoid any heavy lifting  (no more than 3 to 5 lbs) or strenuous activity for five days  -If you develop any swelling, bleeding, hardening of the skin (hematoma formation), acute pain, numbness/tingling in your arm or leg please contact your doctor immediately or call our 24/7 line: [phone]  -Please follow up with Dr. Troy in 1-2 weeks  Please return to the hospital or seek immediate medical attention if you have symptoms including, but not limited to, persistent chest pain or shortness of breath, especially if it’s associated with nausea, vomiting, sweating, passing out, or pain radiating to your jaw, shoulder, or arm.      SECONDARY DISCHARGE DIAGNOSES  Diagnosis: HTN (hypertension)  Assessment and Plan of Treatment: Please continue your home Imdur 60mg daily and Losartan-HCTZ 100-12.5mg daily. There have been no changes.    Diagnosis: HLD (hyperlipidemia)  Assessment and Plan of Treatment: Please continue your home Simvastatin 20mg daily and Vascepa 2G twice daily    Diagnosis: DM (diabetes mellitus)  Assessment and Plan of Treatment: Please continue your home Glimepiride 4mg daily, Levemir 40U daily, Novolog 4U twice daily. Please HOLD your home METFORMIN and RESUME starting 11/1/21.     PRINCIPAL DISCHARGE DIAGNOSIS  Diagnosis: CAD (coronary artery disease)  Assessment and Plan of Treatment: You underwent a cardiac catheterization and the blockage/narrowing due to plaque build-up in your RPLS artery was opened using a stent. You should continue taking aspirin 81mg daily and Plavix 75 mg daily.  NEVER MISS A DOSE OF ASPIRIN OR PLAVIX; IF YOU DO, YOU ARE AT RISK OF YOUR STENT CLOSING AND HAVING A HEART ATTACK. DO NOT STOP THESE TWO MEDICATIONS UNLESS INSTRUCTED TO DO SO BY YOUR CARDIOLOGIST.  Aspirin and Plavix can put you at increased risk of bleeding; please avoid NSAIDS (such as Motrin, Advil, Ibuprofen, Naproxen, or Aleve, as these medications may further your risk of bleeding.  -Your procedure was done through your groin   -You do not need to keep this area covered and you may shower  -Please avoid any heavy lifting  (no more than 3 to 5 lbs) or strenuous activity for five days  -If you develop any swelling, bleeding, hardening of the skin (hematoma formation), acute pain, numbness/tingling in your arm or leg please contact your doctor immediately or call our 24/7 line: [phone]  -Please follow up with Dr. Troy in 1-2 weeks  Please return to the hospital or seek immediate medical attention if you have symptoms including, but not limited to, persistent chest pain or shortness of breath, especially if it’s associated with nausea, vomiting, sweating, passing out, or pain radiating to your jaw, shoulder, or arm.      SECONDARY DISCHARGE DIAGNOSES  Diagnosis: HTN (hypertension)  Assessment and Plan of Treatment: Please continue your home Imdur 60mg daily and Losartan-HCTZ 100-12.5mg daily. There have been no changes.    Diagnosis: HLD (hyperlipidemia)  Assessment and Plan of Treatment: Please continue your home Simvastatin 20mg daily and Vascepa 2G twice daily    Diagnosis: DM (diabetes mellitus)  Assessment and Plan of Treatment: Please continue your home Glimepiride 4mg daily, Levemir 40U daily, Novolog 4U twice daily. Please HOLD your home METFORMIN and RESUME starting 11/1/21.     PRINCIPAL DISCHARGE DIAGNOSIS  Diagnosis: CAD (coronary artery disease)  Assessment and Plan of Treatment: You underwent a cardiac catheterization and the blockage/narrowing due to plaque build-up in your RPLS artery was opened using a stent. You should continue taking aspirin 81mg daily and Plavix 75 mg daily.  NEVER MISS A DOSE OF ASPIRIN OR PLAVIX; IF YOU DO, YOU ARE AT RISK OF YOUR STENT CLOSING AND HAVING A HEART ATTACK. DO NOT STOP THESE TWO MEDICATIONS UNLESS INSTRUCTED TO DO SO BY YOUR CARDIOLOGIST.  Aspirin and Plavix can put you at increased risk of bleeding; please avoid NSAIDS (such as Motrin, Advil, Ibuprofen, Naproxen, or Aleve, as these medications may further your risk of bleeding.  -Your procedure was done through your groin   -You do not need to keep this area covered and you may shower  -Please avoid any heavy lifting  (no more than 3 to 5 lbs) or strenuous activity for five days  -If you develop any swelling, bleeding, hardening of the skin (hematoma formation), acute pain, numbness/tingling in your arm or leg please contact your doctor immediately or call our 24/7 line: [phone]  -Please follow up with Dr. Troy in 1-2 weeks  Please return to the hospital or seek immediate medical attention if you have symptoms including, but not limited to, persistent chest pain or shortness of breath, especially if it’s associated with nausea, vomiting, sweating, passing out, or pain radiating to your jaw, shoulder, or arm.      SECONDARY DISCHARGE DIAGNOSES  Diagnosis: HTN (hypertension)  Assessment and Plan of Treatment: Please continue your home Imdur 60mg daily and Losartan-HCTZ 100-12.5mg daily. There have been no changes.    Diagnosis: HLD (hyperlipidemia)  Assessment and Plan of Treatment: Please continue your home Simvastatin 20mg daily and Vascepa 2G twice daily    Diagnosis: DM (diabetes mellitus)  Assessment and Plan of Treatment: Please continue your home Glimepiride 4mg daily, Levemir 40U daily, Novolog 4U twice daily. Please HOLD your home METFORMIN and RESUME starting 11/1/21.     PRINCIPAL DISCHARGE DIAGNOSIS  Diagnosis: CAD (coronary artery disease)  Assessment and Plan of Treatment: You underwent a cardiac catheterization and the blockage/narrowing due to plaque build-up in your proximal, middle and distal right coronary artery were opened using 3 stents. You should continue taking aspirin 81mg daily and Plavix 75 mg daily.  NEVER MISS A DOSE OF ASPIRIN OR PLAVIX; IF YOU DO, YOU ARE AT RISK OF YOUR STENT CLOSING AND HAVING A HEART ATTACK. DO NOT STOP THESE TWO MEDICATIONS UNLESS INSTRUCTED TO DO SO BY YOUR CARDIOLOGIST.  Aspirin and Plavix can put you at increased risk of bleeding; please avoid NSAIDS (such as Motrin, Advil, Ibuprofen, Naproxen, or Aleve, as these medications may further your risk of bleeding.  -Your procedure was done through your groin   -You do not need to keep this area covered and you may shower  -Please avoid any heavy lifting  (no more than 3 to 5 lbs) or strenuous activity for five days  -If you develop any swelling, bleeding, hardening of the skin (hematoma formation), acute pain, numbness/tingling in your arm or leg please contact your doctor immediately or call our 24/7 line: [phone]  -Please follow up with Dr. Troy in 1-2 weeks  Please return to the hospital or seek immediate medical attention if you have symptoms including, but not limited to, persistent chest pain or shortness of breath, especially if it’s associated with nausea, vomiting, sweating, passing out, or pain radiating to your jaw, shoulder, or arm.      SECONDARY DISCHARGE DIAGNOSES  Diagnosis: HTN (hypertension)  Assessment and Plan of Treatment: Please continue your home Imdur 60mg daily and Losartan-HCTZ 100-12.5mg daily. There have been no changes.    Diagnosis: HLD (hyperlipidemia)  Assessment and Plan of Treatment: Please continue your home Simvastatin 20mg daily and Vascepa 2G twice daily    Diagnosis: DM (diabetes mellitus)  Assessment and Plan of Treatment: Please continue your home Glimepiride 4mg daily, Levemir 40U daily, Novolog 4U twice daily. Please HOLD your home METFORMIN and RESUME starting 11/1/21.     PRINCIPAL DISCHARGE DIAGNOSIS  Diagnosis: CAD (coronary artery disease)  Assessment and Plan of Treatment: You underwent a cardiac catheterization and the blockage/narrowing due to plaque build-up in your proximal, middle and distal right coronary artery were opened using 3 stents. You should continue taking aspirin 81mg daily and Plavix 75 mg daily.  NEVER MISS A DOSE OF ASPIRIN OR PLAVIX; IF YOU DO, YOU ARE AT RISK OF YOUR STENT CLOSING AND HAVING A HEART ATTACK. DO NOT STOP THESE TWO MEDICATIONS UNLESS INSTRUCTED TO DO SO BY YOUR CARDIOLOGIST.  Aspirin and Plavix can put you at increased risk of bleeding; please avoid NSAIDS (such as Motrin, Advil, Ibuprofen, Naproxen, or Aleve, as these medications may further your risk of bleeding.  -Your procedure was done through your groin   -You do not need to keep this area covered and you may shower  -Please avoid any heavy lifting  (no more than 3 to 5 lbs) or strenuous activity for five days  -If you develop any swelling, bleeding, hardening of the skin (hematoma formation), acute pain, numbness/tingling in your arm or leg please contact your doctor immediately or call our 24/7 line: [phone]  -Please follow up with Dr. Troy in 1-2 weeks  Please return to the hospital or seek immediate medical attention if you have symptoms including, but not limited to, persistent chest pain or shortness of breath, especially if it’s associated with nausea, vomiting, sweating, passing out, or pain radiating to your jaw, shoulder, or arm.      SECONDARY DISCHARGE DIAGNOSES  Diagnosis: HTN (hypertension)  Assessment and Plan of Treatment: Please continue your home Imdur 60mg daily and Losartan-HCTZ 100-12.5mg daily. There have been no changes.    Diagnosis: HLD (hyperlipidemia)  Assessment and Plan of Treatment: Please continue your home Simvastatin 20mg daily and Vascepa 2G twice daily    Diagnosis: DM (diabetes mellitus)  Assessment and Plan of Treatment: Please continue your home Glimepiride 4mg daily, Levemir 40U daily, Novolog 4U twice daily. Please HOLD your home METFORMIN and RESUME starting 11/1/21.     PRINCIPAL DISCHARGE DIAGNOSIS  Diagnosis: CAD (coronary artery disease)  Assessment and Plan of Treatment: You underwent a cardiac catheterization and the blockage/narrowing due to plaque build-up in your proximal, middle and distal right coronary artery were opened using 3 stents. You should continue taking aspirin 81mg daily and Plavix 75 mg daily.  NEVER MISS A DOSE OF ASPIRIN OR PLAVIX; IF YOU DO, YOU ARE AT RISK OF YOUR STENT CLOSING AND HAVING A HEART ATTACK. DO NOT STOP THESE TWO MEDICATIONS UNLESS INSTRUCTED TO DO SO BY YOUR CARDIOLOGIST.  Aspirin and Plavix can put you at increased risk of bleeding; please avoid NSAIDS (such as Motrin, Advil, Ibuprofen, Naproxen, or Aleve, as these medications may further your risk of bleeding.  -Your procedure was done through your groin   -You do not need to keep this area covered and you may shower  -Please avoid any heavy lifting  (no more than 3 to 5 lbs) or strenuous activity for five days  -If you develop any swelling, bleeding, hardening of the skin (hematoma formation), acute pain, numbness/tingling in your arm or leg please contact your doctor immediately or call our 24/7 line: [phone]  -Please follow up with Dr. Troy in 1-2 weeks  Please return to the hospital or seek immediate medical attention if you have symptoms including, but not limited to, persistent chest pain or shortness of breath, especially if it’s associated with nausea, vomiting, sweating, passing out, or pain radiating to your jaw, shoulder, or arm.      SECONDARY DISCHARGE DIAGNOSES  Diagnosis: HTN (hypertension)  Assessment and Plan of Treatment: Please continue your home Imdur 60mg daily and Losartan-HCTZ 100-12.5mg daily. There have been no changes.    Diagnosis: HLD (hyperlipidemia)  Assessment and Plan of Treatment: Please continue your home Simvastatin 20mg daily and Vascepa 2G twice daily    Diagnosis: DM (diabetes mellitus)  Assessment and Plan of Treatment: Please continue your home Glimepiride 4mg daily, Levemir 40U daily, Novolog 4U twice daily. Please HOLD your home METFORMIN and RESUME starting 11/1/21.     PRINCIPAL DISCHARGE DIAGNOSIS  Diagnosis: CAD (coronary artery disease)  Assessment and Plan of Treatment: You have a diagnosis of coronary artery disease and received three stents to your right coronary artery. You have been continued on Aspirin 81mg daily and Plavix (Clopidogrel) 75mg daily. You MUST continue taking the daily Aspirin and Plavix to ensure your stent does not close. DO NOT STOP THESE MEDICATIONS FOR ANY REASON UNLESS OTHERWISE INDICATED BY YOUR CARDIOLOGIST BECAUSE THIS WILL PUT YOU AT RISK FOR A HEART ATTACK. You should refrain from strenuous activity and heavy lifting for 1 week. Please make a follow up appointment with your cardiologist within 1-2 weeks of your discharge. All of your prescriptions have been sent electronically to your pharmacy.  The catheter from your groin was removed and bleeding was stopped with a closure device.  After 24hours you may take off the dressing and shower. Wash the site with soap and water.  There is no need to put on another bandage.  Avoid tub baths, hot tubs or swimming for 5 days.      Call the Interventional Cardiology and Vascular Team at [phone] or [phone] if any of following occur pertaining to your vascular access site: Bleeding or hematoma formation (collection of blood under the skin), drainage or redness at the puncture site, numbness, decrease in strength, coolness or pale coloration of skin of the leg or hand.      SECONDARY DISCHARGE DIAGNOSES  Diagnosis: Hypertension  Assessment and Plan of Treatment: You have a history of being diagnosed with high blood pressure and should continue taking your home medications as they were previously prescribed to you.    Diagnosis: Hyperlipidemia  Assessment and Plan of Treatment: A lipid panel was performed during your admission and showed your total cholesterol is 211, triglycerides are 130, HDL is 62, and LDL is 123. Please continue taking your home medications as they were previously prescribed to you.    Diagnosis: Diabetes mellitus  Assessment and Plan of Treatment: You have a history of being diagnosed with diabetes mellitus and should continue taking your home medications as they were previously prescribed. However, PLEASE DO NOT RESTART YOUR METFORMIN UNTIL 11/01/2021.     PRINCIPAL DISCHARGE DIAGNOSIS  Diagnosis: CAD (coronary artery disease)  Assessment and Plan of Treatment: You have a diagnosis of coronary artery disease and received three stents to your right coronary artery. You have been continued on Aspirin 81mg daily and Plavix (Clopidogrel) 75mg daily. You MUST continue taking the daily Aspirin and Plavix to ensure your stent does not close. DO NOT STOP THESE MEDICATIONS FOR ANY REASON UNLESS OTHERWISE INDICATED BY YOUR CARDIOLOGIST BECAUSE THIS WILL PUT YOU AT RISK FOR A HEART ATTACK. You should refrain from strenuous activity and heavy lifting for 1 week. Please make a follow up appointment with your cardiologist within 1-2 weeks of your discharge. All of your prescriptions have been sent electronically to your pharmacy.  The catheter from your groin was removed and bleeding was stopped with a closure device.  After 24hours you may take off the dressing and shower. Wash the site with soap and water.  There is no need to put on another bandage.  Avoid tub baths, hot tubs or swimming for 5 days.      Call the Interventional Cardiology and Vascular Team at [phone] or [phone] if any of following occur pertaining to your vascular access site: Bleeding or hematoma formation (collection of blood under the skin), drainage or redness at the puncture site, numbness, decrease in strength, coolness or pale coloration of skin of the leg or hand.      SECONDARY DISCHARGE DIAGNOSES  Diagnosis: Hypertension  Assessment and Plan of Treatment: You have a history of being diagnosed with high blood pressure and should continue taking your home medications as they were previously prescribed to you.    Diagnosis: Hyperlipidemia  Assessment and Plan of Treatment: A lipid panel was performed during your admission and showed your total cholesterol is 211, triglycerides are 130, HDL is 62, and LDL is 123. Please continue taking your home medications as they were previously prescribed to you.    Diagnosis: Diabetes mellitus  Assessment and Plan of Treatment: You have a history of being diagnosed with diabetes mellitus and should continue taking your home medications as they were previously prescribed. However, PLEASE DO NOT RESTART YOUR METFORMIN UNTIL 11/01/2021.     PRINCIPAL DISCHARGE DIAGNOSIS  Diagnosis: CAD (coronary artery disease)  Assessment and Plan of Treatment: You have a diagnosis of coronary artery disease and received three stents to your right coronary artery. You have been continued on Aspirin 81mg daily and Plavix (Clopidogrel) 75mg daily. You MUST continue taking the daily Aspirin and Plavix to ensure your stent does not close. DO NOT STOP THESE MEDICATIONS FOR ANY REASON UNLESS OTHERWISE INDICATED BY YOUR CARDIOLOGIST BECAUSE THIS WILL PUT YOU AT RISK FOR A HEART ATTACK. You should refrain from strenuous activity and heavy lifting for 1 week. Please make a follow up appointment with your cardiologist within 1-2 weeks of your discharge. All of your prescriptions have been sent electronically to your pharmacy.  The catheter from your groin was removed and bleeding was stopped with a closure device.  After 24hours you may take off the dressing and shower. Wash the site with soap and water.  There is no need to put on another bandage.  Avoid tub baths, hot tubs or swimming for 5 days.      Call the Interventional Cardiology and Vascular Team at [phone] or [phone] if any of following occur pertaining to your vascular access site: Bleeding or hematoma formation (collection of blood under the skin), drainage or redness at the puncture site, numbness, decrease in strength, coolness or pale coloration of skin of the leg or hand.      SECONDARY DISCHARGE DIAGNOSES  Diagnosis: Hypertension  Assessment and Plan of Treatment: You have a history of being diagnosed with high blood pressure and should continue taking your home medications as they were previously prescribed to you.    Diagnosis: Hyperlipidemia  Assessment and Plan of Treatment: A lipid panel was performed during your admission and showed your total cholesterol is 211, triglycerides are 130, HDL is 62, and LDL is 123. Please continue taking your home medications as they were previously prescribed to you.    Diagnosis: Diabetes mellitus  Assessment and Plan of Treatment: You have a history of being diagnosed with diabetes mellitus and should continue taking your home medications as they were previously prescribed. However, PLEASE DO NOT RESTART YOUR METFORMIN UNTIL 11/01/2021.

## 2021-10-29 NOTE — DISCHARGE NOTE PROVIDER - CARE PROVIDERS DIRECT ADDRESSES
[email or web address]

## 2021-10-29 NOTE — CHART NOTE - NSCHARTNOTEFT_GEN_A_CORE
CV Prevention: Patient was seen by a member of the Cardiovascular Prevention Program and was provided with educational handouts for atherosclerotic heart disease and prevention resources. Our program staff has previously reached out to the patient by phone to set up a telehealth visit for full risk factor assessment and optimization, however, patient says she prefers to deal only with Dr. Troy.

## 2021-10-29 NOTE — CHART NOTE - NSCHARTNOTEFT_GEN_A_CORE
Interventional Cardiology PA Sheath Pull Note    Pt without complaints. VSS      Pre-Sheath Removal:    [ ] Right     [ ] Left          [ ] Groin    [ ] Brachial    [ ] 5Fr      [ ] 6Fr    [ ] 7Fr     [ ] 8Fr    [ ] Arterial  [ ] Venous sheath in place    [ ] Hematoma        [ ] Bleed    Pulses:    [ ] Right     [ ] Left       DP:  [ ]  Doppler   [ ]  Faint    [ ]   1+    [ ] 2+       Hemostasis Achieved with:                 minutes manual pressure    Vasovagal Reaction: Yes/No    Meds Given:      Post-Sheath Removal:    [ ] Right     [ ] Left          [ ] Groin    [ ] Brachial    [ ] Hematoma        [ ] Bleed       [ ] Bruit    Pulses:    [ ] Right     [ ] Left       DP:  [ ]  Doppler   [ ]  Faint    [ ]   1+    [ ] 2+     A/P:  s/p [ ] Dx Cath      [ ] IVUS/FFR     [ ] PTA/STENT       [ ] PTCA/STENT    -Continue bedrest (pt given instructions)  -Continue to monitor Interventional Cardiology PA Sheath Pull Note    Pt without complaints. VSS      Pre-Sheath Removal:    [x ] Right  [x ] Groin     [x ] 6Fr   [x ] Venous sheath in place    [ n] Hematoma        [n ] Bleed    Pulses:   [x ] Left       DP:  [ ]  Doppler   [ ]  Faint    [x ]   1+    [ ] 2+       Hemostasis Achieved with:                 minutes manual pressure    Vasovagal Reaction: Yes/No    Meds Given:      Post-Sheath Removal:    [ ] Right     [x ] Left          [ ] Groin    [ ] Brachial    [ ] Hematoma        [ ] Bleed       [ ] Bruit    Pulses:    [x ] Right     [x ] Left       DP:  [ ]  Doppler   [ ]  Faint    [x ]   1+        A/P:  s/p    [x ] PTCA/STENT    -Continue bedrest (pt given instructions)  -Continue to monitor

## 2021-10-29 NOTE — DISCHARGE NOTE PROVIDER - NSDCMRMEDTOKEN_GEN_ALL_CORE_FT
Aricept 5 mg oral tablet: 1 tab(s) orally once a day (at bedtime)  Aspirin Enteric Coated 81 mg oral delayed release tablet: 1 tab(s) orally once a day  Cardiac Rehab1: Cardiac Rehab for dx CAD s/p stent placement:     3 days/week x 12 weeks     Cardiologist: Dr. Troy   clopidogrel 75 mg oral tablet: 1 tab(s) orally once a day  Dexilant 60 mg oral delayed release capsule: 1 cap(s) orally once a day  gabapentin enacarbil 600 mg oral tablet, extended release: 1 tab(s) orally 2 times a day  glimepiride 4 mg oral tablet: 1 tab(s) orally once a day  Imdur 60 mg oral tablet, extended release: 1 tab(s) orally once a day (in the morning)  Levemir 100 units/mL subcutaneous solution: 40 unit(s) subcutaneous once a day (at bedtime)  losartan-hydrochlorothiazide 100 mg-12.5 mg oral tablet: 1 tab(s) orally once a day  metFORMIN 1000 mg oral tablet: 1 tab(s) orally 2 times a day. RESUME 10/4  NovoLOG 100 units/mL injectable solution: 4 unit(s) injectable 2 times a day (with meals)  simvastatin 20 mg oral tablet: 1 tab(s) orally once a day (at bedtime)  Vascepa 1 g oral capsule: 2 cap(s) orally 2 times a day   Aricept 5 mg oral tablet: 1 tab(s) orally once a day (at bedtime)  Aspirin Enteric Coated 81 mg oral delayed release tablet: 1 tab(s) orally once a day  Cardiac Rehab1: Cardiac Rehab for dx CAD s/p stent placement:     3 days/week x 12 weeks     Cardiologist: Dr. Troy   clopidogrel 75 mg oral tablet: 1 tab(s) orally once a day  Dexilant 60 mg oral delayed release capsule: 1 cap(s) orally once a day  gabapentin enacarbil 600 mg oral tablet, extended release: 1 tab(s) orally 2 times a day  glimepiride 4 mg oral tablet: 1 tab(s) orally once a day  Imdur 60 mg oral tablet, extended release: 1 tab(s) orally once a day (in the morning)  Levemir 100 units/mL subcutaneous solution: 40 unit(s) subcutaneous once a day (at bedtime)  losartan-hydrochlorothiazide 100 mg-12.5 mg oral tablet: 1 tab(s) orally once a day  metFORMIN 1000 mg oral tablet: 1 tab(s) orally 2 times a day. RESUME ON 11/01/2021.  NovoLOG 100 units/mL injectable solution: 4 unit(s) injectable 2 times a day (with meals)  simvastatin 20 mg oral tablet: 1 tab(s) orally once a day (at bedtime)  Vascepa 1 g oral capsule: 2 cap(s) orally 2 times a day

## 2021-10-29 NOTE — DISCHARGE NOTE PROVIDER - HOSPITAL COURSE
76 Y F Indonesian speaking  POOR HISTORIAN former smoker with pmh colon cancer (s/p chemo/radiation treatment 15 yrs ago), asthma (no hosp/intubation), HTN, HLD, DM II, CAD (s/p MEG oLAD, MEG mLAD, MEG x2 OM2 @[facility] 10/1/21) who initially  presented to her cardiologist c/o progressively worsening L sided chest pressure 6/10 with radiation to jaw and associated nausea, dizziness, diaphoresis, fatigue  palpitations, and SOB with exertion of 1 block occurring for 3 months.  Symptoms resolve with rest.  Pt denies orthopnea, PND, LE edema, V/D, fever, chills, sick contact, or recent travel. Since last cath pt endorsing resolution of chest pain and SOB.  Complaint the DAPT Aspirin 81mg QD and Plavix 75mg QD.  ECHO 9/23/21: EF 65-70%, mild diastolic dysfunction, mild MR.   In light of risk factors, CCS angina class III symptoms, and residual RCA disease pt presents for staged PCI. 76 Y F Romanian speaking  POOR HISTORIAN former smoker with pmh colon cancer (s/p chemo/radiation treatment 15 yrs ago), asthma (no hosp/intubation), HTN, HLD, DM II, CAD (s/p MEG oLAD, MEG mLAD, MEG x2 OM2 @[facility] 10/1/21) who initially  presented to her cardiologist c/o progressively worsening L sided chest pressure 6/10 with radiation to jaw and associated nausea, dizziness, diaphoresis, fatigue  palpitations, and SOB with exertion of 1 block occurring for 3 months.  Symptoms resolve with rest.  Pt denies orthopnea, PND, LE edema, V/D, fever, chills, sick contact, or recent travel. Since last cath pt endorsing resolution of chest pain and SOB.  Complaint the DAPT Aspirin 81mg QD and Plavix 75mg QD.  ECHO 9/23/21: EF 65-70%, mild diastolic dysfunction, mild MR.   In light of risk factors, CCS angina class III symptoms, and residual RCA disease pt presents for staged PCI. 76 Y F Wolof speaking  POOR HISTORIAN former smoker with pmh colon cancer (s/p chemo/radiation treatment 15 yrs ago), asthma (no hosp/intubation), HTN, HLD, DM II, CAD (s/p MEG oLAD, MEG mLAD, MEG x2 OM2 @[facility] 10/1/21) who initially  presented to her cardiologist c/o progressively worsening L sided chest pressure 6/10 with radiation to jaw and associated nausea, dizziness, diaphoresis, fatigue  palpitations, and SOB with exertion of 1 block occurring for 3 months.  Symptoms resolve with rest.  Pt denies orthopnea, PND, LE edema, V/D, fever, chills, sick contact, or recent travel. Since last cath pt endorsing resolution of chest pain and SOB.  Complaint the DAPT Aspirin 81mg QD and Plavix 75mg QD.  ECHO 9/23/21: EF 65-70%, mild diastolic dysfunction, mild MR.   In light of risk factors, CCS angina class III symptoms, and residual RCA disease pt presents for staged PCI. 76 Y F Greek speaking  POOR HISTORIAN former smoker with pmh colon cancer (s/p chemo/radiation treatment 15 yrs ago), asthma (no hosp/intubation), HTN, HLD, DM II, CAD (s/p MEG oLAD, MEG mLAD, MEG x2 OM2 @[facility] 10/1/21) who initially  presented to her cardiologist c/o progressively worsening L sided chest pressure 6/10 with radiation to jaw and associated nausea, dizziness, diaphoresis, fatigue  palpitations, and SOB with exertion of 1 block occurring for 3 months.  Symptoms resolve with rest.  Pt denies orthopnea, PND, LE edema, V/D, fever, chills, sick contact, or recent travel. Since last cath pt endorsing resolution of chest pain and SOB.  Complaint the DAPT Aspirin 81mg QD and Plavix 75mg QD.  ECHO 9/23/21: EF 65-70%, mild diastolic dysfunction, mild MR.   In light of risk factors, CCS angina class III symptoms, and residual RCA disease pt presents for staged PCI.     s/p cardiac cath 10/29/21: MEG x 3 p/m/d RCA, LAD and LCX stents patent, pRCA 90%, mRCA 80%, dRCA 95%, EF 65-70%, R PC, TVP removed with R 6FV (early bird) MAX.    Patient seen and examined at the bedside. No complaints at this time. AM labs WNL. HD stable. R groin access site stable with distal pulses to baseline. No overnight events on tele, AM ECG non ischemic. As per Dr. Troy, patient is stable for discharge home on ASA 81mg QD, Plavix 75mg QD, Imdur 60mg QD, Vascepa 1G 2 capsule BID, Losartan-HCTZ 100-12.5 QD with instruction to follow up with Dr. Troy in 1-2 weeks.    Reasons for No Cardiac Rehab Referral Rx (Must select 1 or more options):                Patient Refused            Medical Reason: ex needs Home Care, Home PT            Patient lacks medical coverage for Cardiac Rehab            Pt discharged to Nursing Care/NEO/Long term Care Facility            Patient Lacks Transportation or no cardiac rehab within 60 minutes driving range            Patient already participates in Cardiac Rehab            Other: (provide details) ex: Hospice patient    Statin Prescribed (STEMI/NSTEMI/UA &/OR PCI this admission):  Yes/No; If No, Why Not?  DAPT: Prescriptions for Aspirin/Plavix/Brilinta/Effient e-prescribed to patient's pharmacy Yes/No__.                            76 Y F Hungarian speaking  POOR HISTORIAN former smoker with pmh colon cancer (s/p chemo/radiation treatment 15 yrs ago), asthma (no hosp/intubation), HTN, HLD, DM II, CAD (s/p MEG oLAD, MEG mLAD, MEG x2 OM2 @[facility] 10/1/21) who initially  presented to her cardiologist c/o progressively worsening L sided chest pressure 6/10 with radiation to jaw and associated nausea, dizziness, diaphoresis, fatigue  palpitations, and SOB with exertion of 1 block occurring for 3 months.  Symptoms resolve with rest.  Pt denies orthopnea, PND, LE edema, V/D, fever, chills, sick contact, or recent travel. Since last cath pt endorsing resolution of chest pain and SOB.  Complaint the DAPT Aspirin 81mg QD and Plavix 75mg QD.  ECHO 9/23/21: EF 65-70%, mild diastolic dysfunction, mild MR.   In light of risk factors, CCS angina class III symptoms, and residual RCA disease pt presents for staged PCI.     s/p cardiac cath 10/29/21: MEG x 3 p/m/d RCA, LAD and LCX stents patent, pRCA 90%, mRCA 80%, dRCA 95%, EF 65-70%, R PC, TVP removed with R 6FV (early bird) MAX.    Patient seen and examined at the bedside. No complaints at this time. AM labs WNL. HD stable. R groin access site stable with distal pulses to baseline. No overnight events on tele, AM ECG non ischemic. As per Dr. Troy, patient is stable for discharge home on ASA 81mg QD, Plavix 75mg QD, Imdur 60mg QD, Vascepa 1G 2 capsule BID, Losartan-HCTZ 100-12.5 QD with instruction to follow up with Dr. Troy in 1-2 weeks.    Reasons for No Cardiac Rehab Referral Rx (Must select 1 or more options):                Patient Refused            Medical Reason: ex needs Home Care, Home PT            Patient lacks medical coverage for Cardiac Rehab            Pt discharged to Nursing Care/NEO/Long term Care Facility            Patient Lacks Transportation or no cardiac rehab within 60 minutes driving range            Patient already participates in Cardiac Rehab            Other: (provide details) ex: Hospice patient    Statin Prescribed (STEMI/NSTEMI/UA &/OR PCI this admission):  Yes/No; If No, Why Not?  DAPT: Prescriptions for Aspirin/Plavix/Brilinta/Effient e-prescribed to patient's pharmacy Yes/No__.                            76 Y F Kazakh speaking  POOR HISTORIAN former smoker with pmh colon cancer (s/p chemo/radiation treatment 15 yrs ago), asthma (no hosp/intubation), HTN, HLD, DM II, CAD (s/p MEG oLAD, MEG mLAD, MEG x2 OM2 @[facility] 10/1/21) who initially  presented to her cardiologist c/o progressively worsening L sided chest pressure 6/10 with radiation to jaw and associated nausea, dizziness, diaphoresis, fatigue  palpitations, and SOB with exertion of 1 block occurring for 3 months.  Symptoms resolve with rest.  Pt denies orthopnea, PND, LE edema, V/D, fever, chills, sick contact, or recent travel. Since last cath pt endorsing resolution of chest pain and SOB.  Complaint the DAPT Aspirin 81mg QD and Plavix 75mg QD.  ECHO 9/23/21: EF 65-70%, mild diastolic dysfunction, mild MR.   In light of risk factors, CCS angina class III symptoms, and residual RCA disease pt presents for staged PCI.     s/p cardiac cath 10/29/21: MEG x 3 p/m/d RCA, LAD and LCX stents patent, pRCA 90%, mRCA 80%, dRCA 95%, EF 65-70%, R PC, TVP removed with R 6FV (early bird) MAX.    Patient seen and examined at the bedside. No complaints at this time. AM labs WNL. HD stable. R groin access site stable with distal pulses to baseline. No overnight events on tele, AM ECG non ischemic. As per Dr. Troy, patient is stable for discharge home on ASA 81mg QD, Plavix 75mg QD, Imdur 60mg QD, Vascepa 1G 2 capsule BID, Losartan-HCTZ 100-12.5 QD with instruction to follow up with Dr. Troy in 1-2 weeks.    Reasons for No Cardiac Rehab Referral Rx (Must select 1 or more options):                Patient Refused            Medical Reason: ex needs Home Care, Home PT            Patient lacks medical coverage for Cardiac Rehab            Pt discharged to Nursing Care/NEO/Long term Care Facility            Patient Lacks Transportation or no cardiac rehab within 60 minutes driving range            Patient already participates in Cardiac Rehab            Other: (provide details) ex: Hospice patient    Statin Prescribed (STEMI/NSTEMI/UA &/OR PCI this admission):  Yes/No; If No, Why Not?  DAPT: Prescriptions for Aspirin/Plavix/Brilinta/Effient e-prescribed to patient's pharmacy Yes/No__.                            77 y/o Albanian speaking female, POOR HISTORIAN, former smoker, w/ PMHs HTN, HLD,  type II DM, CAD (s/p MEG ostial LAD, mid LAD, and OM2 and residual RCA disease on 10/01/2021 at [facility]), asthma (denies any hospitalizations/intubations), and colon cancer (s/p chemotherapy/radiation 15 years ago) who presented to her outpatient cardiologist c/o progressively worsening left sided chest pressure, 6 out of 10 in severity, w/ radiation to the jaw, and associated w/ nausea, dizziness, diaphoresis, fatigue, palpitations, and SOB w/ exertion of 1 block occurring for approximately 3 months. Patient reported that symptoms resolve w/ rest. Patient denied any orthopnea/PND, LE edema, vomiting, diarrhea, fever, chills, or known sick contacts. Since last cardiac catheterization, patient endorsed resolution of chest pain and SOB and endorsed compliance w/ DAPT (Aspirin/Plavix). Echocardiogram (09/23/2021) revealed EF 65-70%, mild diastolic dysfunction, and mild MR. In light of patient's risk factors and known residual disease, patient presented for staged PCI.     Patient is now s/p cardiac catheterization w/ MEG x 3 proximal/mid/distal RCA and other findings of LAD and LCx stents patent. TVP was placed during procedure, pulled and 6F venous sheath was initially left in the right femoral vein and eventually pulled appropriately and w/o complications. Right femoral artery was used as access for the procedure and Perclose closure device was utilized.     Patient was seen and examined at bedside on 10/30/2021 and denied any complaints on exam. Right groin access site remained stable and dressing was clean/dry/intact. Repeat EKG was w/o acute ischemic changes and no significant events were noted overnight on telemetry. Labs were reviewed and remained stable at patient's baseline. Patient has now been medically cleared for discharge as per Dr. Troy. Patient has been given appropriate discharge instructions including medication regimen, access site management, and follow up. Any medications the patient requires refills on have been e-prescribed to patient's preferred pharmacy.     VS Stable   Gen: NAD, A&O x3  Cards: RRR, clear S1 and S2 without murmur  Pulm: CTA B/L without w/r/r  Right Groin: No hematoma or ooze, peripheral pulses 2+ B/L  Abd: soft, NT  Ext: no LE edema or ulcerations B/L    Reasons for No Cardiac Rehab Referral Rx (Must select 1 or more options): Patient already participates in Cardiac Rehab.     Statin Prescribed (PCI this admission): Yes  DAPT: Prescriptions for Aspirin/Plavix e-prescribed to patient's preferred pharmacy.                  77 y/o Chinese speaking female, POOR HISTORIAN, former smoker, w/ PMHs HTN, HLD,  type II DM, CAD (s/p MEG ostial LAD, mid LAD, and OM2 and residual RCA disease on 10/01/2021 at [facility]), asthma (denies any hospitalizations/intubations), and colon cancer (s/p chemotherapy/radiation 15 years ago) who presented to her outpatient cardiologist c/o progressively worsening left sided chest pressure, 6 out of 10 in severity, w/ radiation to the jaw, and associated w/ nausea, dizziness, diaphoresis, fatigue, palpitations, and SOB w/ exertion of 1 block occurring for approximately 3 months. Patient reported that symptoms resolve w/ rest. Patient denied any orthopnea/PND, LE edema, vomiting, diarrhea, fever, chills, or known sick contacts. Since last cardiac catheterization, patient endorsed resolution of chest pain and SOB and endorsed compliance w/ DAPT (Aspirin/Plavix). Echocardiogram (09/23/2021) revealed EF 65-70%, mild diastolic dysfunction, and mild MR. In light of patient's risk factors and known residual disease, patient presented for staged PCI.     Patient is now s/p cardiac catheterization w/ MEG x 3 proximal/mid/distal RCA and other findings of LAD and LCx stents patent. TVP was placed during procedure, pulled and 6F venous sheath was initially left in the right femoral vein and eventually pulled appropriately and w/o complications. Right femoral artery was used as access for the procedure and Perclose closure device was utilized.     Patient was seen and examined at bedside on 10/30/2021 and denied any complaints on exam. Right groin access site remained stable and dressing was clean/dry/intact. Repeat EKG was w/o acute ischemic changes and no significant events were noted overnight on telemetry. Labs were reviewed and remained stable at patient's baseline. Patient has now been medically cleared for discharge as per Dr. Troy. Patient has been given appropriate discharge instructions including medication regimen, access site management, and follow up. Any medications the patient requires refills on have been e-prescribed to patient's preferred pharmacy.     VS Stable   Gen: NAD, A&O x3  Cards: RRR, clear S1 and S2 without murmur  Pulm: CTA B/L without w/r/r  Right Groin: No hematoma or ooze, peripheral pulses 2+ B/L  Abd: soft, NT  Ext: no LE edema or ulcerations B/L    Reasons for No Cardiac Rehab Referral Rx (Must select 1 or more options): Patient already participates in Cardiac Rehab.     Statin Prescribed (PCI this admission): Yes  DAPT: Prescriptions for Aspirin/Plavix e-prescribed to patient's preferred pharmacy.                  77 y/o Romanian speaking female, POOR HISTORIAN, former smoker, w/ PMHs HTN, HLD,  type II DM, CAD (s/p MEG ostial LAD, mid LAD, and OM2 and residual RCA disease on 10/01/2021 at [facility]), asthma (denies any hospitalizations/intubations), and colon cancer (s/p chemotherapy/radiation 15 years ago) who presented to her outpatient cardiologist c/o progressively worsening left sided chest pressure, 6 out of 10 in severity, w/ radiation to the jaw, and associated w/ nausea, dizziness, diaphoresis, fatigue, palpitations, and SOB w/ exertion of 1 block occurring for approximately 3 months. Patient reported that symptoms resolve w/ rest. Patient denied any orthopnea/PND, LE edema, vomiting, diarrhea, fever, chills, or known sick contacts. Since last cardiac catheterization, patient endorsed resolution of chest pain and SOB and endorsed compliance w/ DAPT (Aspirin/Plavix). Echocardiogram (09/23/2021) revealed EF 65-70%, mild diastolic dysfunction, and mild MR. In light of patient's risk factors and known residual disease, patient presented for staged PCI.     Patient is now s/p cardiac catheterization w/ MEG x 3 proximal/mid/distal RCA and other findings of LAD and LCx stents patent. TVP was placed during procedure, pulled and 6F venous sheath was initially left in the right femoral vein and eventually pulled appropriately and w/o complications. Right femoral artery was used as access for the procedure and Perclose closure device was utilized.     Patient was seen and examined at bedside on 10/30/2021 and denied any complaints on exam. Right groin access site remained stable and dressing was clean/dry/intact. Repeat EKG was w/o acute ischemic changes and no significant events were noted overnight on telemetry. Labs were reviewed and remained stable at patient's baseline. Patient has now been medically cleared for discharge as per Dr. Troy. Patient has been given appropriate discharge instructions including medication regimen, access site management, and follow up. Any medications the patient requires refills on have been e-prescribed to patient's preferred pharmacy.     VS Stable   Gen: NAD, A&O x3  Cards: RRR, clear S1 and S2 without murmur  Pulm: CTA B/L without w/r/r  Right Groin: No hematoma or ooze, peripheral pulses 2+ B/L  Abd: soft, NT  Ext: no LE edema or ulcerations B/L    Reasons for No Cardiac Rehab Referral Rx (Must select 1 or more options): Patient already participates in Cardiac Rehab.     Statin Prescribed (PCI this admission): Yes  DAPT: Prescriptions for Aspirin/Plavix e-prescribed to patient's preferred pharmacy.

## 2021-10-30 ENCOUNTER — TRANSCRIPTION ENCOUNTER (OUTPATIENT)
Age: 76
End: 2021-10-30

## 2021-10-30 VITALS
RESPIRATION RATE: 18 BRPM | OXYGEN SATURATION: 95 % | DIASTOLIC BLOOD PRESSURE: 63 MMHG | HEART RATE: 71 BPM | SYSTOLIC BLOOD PRESSURE: 130 MMHG

## 2021-10-30 LAB
ANION GAP SERPL CALC-SCNC: 7 MMOL/L — SIGNIFICANT CHANGE UP (ref 5–17)
BUN SERPL-MCNC: 23 MG/DL — SIGNIFICANT CHANGE UP (ref 7–23)
CALCIUM SERPL-MCNC: 8.8 MG/DL — SIGNIFICANT CHANGE UP (ref 8.4–10.5)
CHLORIDE SERPL-SCNC: 103 MMOL/L — SIGNIFICANT CHANGE UP (ref 96–108)
CO2 SERPL-SCNC: 27 MMOL/L — SIGNIFICANT CHANGE UP (ref 22–31)
COVID-19 SPIKE DOMAIN AB INTERP: POSITIVE
COVID-19 SPIKE DOMAIN ANTIBODY RESULT: >250 U/ML — HIGH
CREAT SERPL-MCNC: 0.75 MG/DL — SIGNIFICANT CHANGE UP (ref 0.5–1.3)
GLUCOSE BLDC GLUCOMTR-MCNC: 274 MG/DL — HIGH (ref 70–99)
GLUCOSE SERPL-MCNC: 351 MG/DL — HIGH (ref 70–99)
HCT VFR BLD CALC: 37.1 % — SIGNIFICANT CHANGE UP (ref 34.5–45)
HGB BLD-MCNC: 11.6 G/DL — SIGNIFICANT CHANGE UP (ref 11.5–15.5)
MAGNESIUM SERPL-MCNC: 1.7 MG/DL — SIGNIFICANT CHANGE UP (ref 1.6–2.6)
MCHC RBC-ENTMCNC: 27.6 PG — SIGNIFICANT CHANGE UP (ref 27–34)
MCHC RBC-ENTMCNC: 31.3 GM/DL — LOW (ref 32–36)
MCV RBC AUTO: 88.3 FL — SIGNIFICANT CHANGE UP (ref 80–100)
NRBC # BLD: 0 /100 WBCS — SIGNIFICANT CHANGE UP (ref 0–0)
PLATELET # BLD AUTO: 166 K/UL — SIGNIFICANT CHANGE UP (ref 150–400)
POTASSIUM SERPL-MCNC: 3.9 MMOL/L — SIGNIFICANT CHANGE UP (ref 3.5–5.3)
POTASSIUM SERPL-SCNC: 3.9 MMOL/L — SIGNIFICANT CHANGE UP (ref 3.5–5.3)
RBC # BLD: 4.2 M/UL — SIGNIFICANT CHANGE UP (ref 3.8–5.2)
RBC # FLD: 13.3 % — SIGNIFICANT CHANGE UP (ref 10.3–14.5)
SARS-COV-2 IGG+IGM SERPL QL IA: >250 U/ML — HIGH
SARS-COV-2 IGG+IGM SERPL QL IA: POSITIVE
SODIUM SERPL-SCNC: 137 MMOL/L — SIGNIFICANT CHANGE UP (ref 135–145)
WBC # BLD: 4.57 K/UL — SIGNIFICANT CHANGE UP (ref 3.8–10.5)
WBC # FLD AUTO: 4.57 K/UL — SIGNIFICANT CHANGE UP (ref 3.8–10.5)

## 2021-10-30 PROCEDURE — 99238 HOSP IP/OBS DSCHRG MGMT 30/<: CPT

## 2021-10-30 RX ORDER — CLOPIDOGREL BISULFATE 75 MG/1
1 TABLET, FILM COATED ORAL
Qty: 30 | Refills: 11
Start: 2021-10-30 | End: 2022-10-24

## 2021-10-30 RX ORDER — POTASSIUM CHLORIDE 20 MEQ
20 PACKET (EA) ORAL ONCE
Refills: 0 | Status: COMPLETED | OUTPATIENT
Start: 2021-10-30 | End: 2021-10-30

## 2021-10-30 RX ORDER — ASPIRIN/CALCIUM CARB/MAGNESIUM 324 MG
1 TABLET ORAL
Qty: 30 | Refills: 11
Start: 2021-10-30 | End: 2022-10-24

## 2021-10-30 RX ORDER — MAGNESIUM SULFATE 500 MG/ML
2 VIAL (ML) INJECTION ONCE
Refills: 0 | Status: COMPLETED | OUTPATIENT
Start: 2021-10-30 | End: 2021-10-30

## 2021-10-30 RX ADMIN — Medication 6: at 07:42

## 2021-10-30 RX ADMIN — CLOPIDOGREL BISULFATE 75 MILLIGRAM(S): 75 TABLET, FILM COATED ORAL at 11:06

## 2021-10-30 RX ADMIN — PANTOPRAZOLE SODIUM 40 MILLIGRAM(S): 20 TABLET, DELAYED RELEASE ORAL at 06:32

## 2021-10-30 RX ADMIN — Medication 20 MILLIEQUIVALENT(S): at 08:36

## 2021-10-30 RX ADMIN — ISOSORBIDE MONONITRATE 60 MILLIGRAM(S): 60 TABLET, EXTENDED RELEASE ORAL at 11:06

## 2021-10-30 RX ADMIN — Medication 81 MILLIGRAM(S): at 11:06

## 2021-10-30 RX ADMIN — GABAPENTIN 600 MILLIGRAM(S): 400 CAPSULE ORAL at 06:57

## 2021-10-30 RX ADMIN — Medication 50 GRAM(S): at 09:04

## 2021-10-30 NOTE — DISCHARGE NOTE NURSING/CASE MANAGEMENT/SOCIAL WORK - PATIENT PORTAL LINK FT
You can access the FollowMyHealth Patient Portal offered by [facility] by registering at the following website: [email or web address]. By joining [employer]’s FollowMyHealth portal, you will also be able to view your health information using other applications (apps) compatible with our system. You can access the FollowMyHealth Patient Portal offered by [facility] by registering at the following website: [email or web address]. By joining [employer]’s FollowMyHealth portal, you will also be able to view your health information using other applications (apps) compatible with our system. You can access the FollowMyHealth Patient Portal offered by [facility] by registering at the following website: [email or web address]. By joining [employer]’s FollowMyHealth portal, you will also be able to view your health information using other applications (apps) compatible with our system.

## 2021-10-30 NOTE — DISCHARGE NOTE NURSING/CASE MANAGEMENT/SOCIAL WORK - NSDCPEFALRISK_GEN_ALL_CORE
For information on Fall & injury Prevention, visit [email or web address] For information on Fall & injury Prevention, visit [email or web address] For information on Fall & injury Prevention, visit [email or web address]

## 2021-11-01 RX ORDER — METFORMIN HYDROCHLORIDE 850 MG/1
1 TABLET ORAL
Qty: 0 | Refills: 0 | DISCHARGE
Start: 2021-11-01

## 2021-11-03 ENCOUNTER — RX RENEWAL (OUTPATIENT)
Age: 76
End: 2021-11-03

## 2021-12-03 ENCOUNTER — RX RENEWAL (OUTPATIENT)
Age: 76
End: 2021-12-03

## 2021-12-16 ENCOUNTER — APPOINTMENT (OUTPATIENT)
Dept: CARDIOLOGY | Facility: CLINIC | Age: 76
End: 2021-12-16
Payer: MEDICARE

## 2021-12-16 VITALS
OXYGEN SATURATION: 97 % | BODY MASS INDEX: 32.92 KG/M2 | DIASTOLIC BLOOD PRESSURE: 84 MMHG | HEART RATE: 85 BPM | SYSTOLIC BLOOD PRESSURE: 157 MMHG | RESPIRATION RATE: 18 BRPM | TEMPERATURE: 97.4 F | WEIGHT: 180 LBS

## 2021-12-16 PROCEDURE — 93000 ELECTROCARDIOGRAM COMPLETE: CPT

## 2021-12-16 PROCEDURE — 99214 OFFICE O/P EST MOD 30 MIN: CPT

## 2021-12-16 NOTE — DISCUSSION/SUMMARY
[FreeTextEntry1] : 76 F HTN hyperlipidemia DM CAD s/p PCI for staged PCI.\par Continue ASA 81 and plavix 75 for CAD s/p PCI.\par Continue medications for HTN.\par Continue statin for hyperlipidemia.\par Continue DM control.\par Patient received her COVID vaccine.\par

## 2021-12-16 NOTE — REASON FOR VISIT
[Hyperlipidemia] : hyperlipidemia [Hypertension] : hypertension [Coronary Artery Disease] : coronary artery disease [FreeTextEntry1] : 76 F HTN hyperlipidemia DM with CAD s/p PCI for f/u.\par

## 2021-12-16 NOTE — HISTORY OF PRESENT ILLNESS
[FreeTextEntry1] :  \par 1. HTN: on medications. BP controlled.\par 2. Hyperlipidemia: on statin. No SE are noted.\par 3. DM: on glimepiride and levemir.\par 4. CAD s/p PCI: the patient underwent cardiac cath that showed severe 3v CAD. \par She underwent PCI of the LAD and LCx. \par  \par She is on ASA and plavix.\par \par The patient is compliant with medications. She recently underwent PCI of the RCA on 10-29. \par

## 2021-12-16 NOTE — PHYSICAL EXAM
[Well Developed] : well developed [Well Nourished] : well nourished [No Acute Distress] : no acute distress [Normal Conjunctiva] : normal conjunctiva [Normal Venous Pressure] : normal venous pressure [No Carotid Bruit] : no carotid bruit [Normal S1, S2] : normal S1, S2 [No Rub] : no rub [No Gallop] : no gallop [Clear Lung Fields] : clear lung fields [Good Air Entry] : good air entry [No Respiratory Distress] : no respiratory distress  [Soft] : abdomen soft [Non Tender] : non-tender [No Masses/organomegaly] : no masses/organomegaly [Normal Bowel Sounds] : normal bowel sounds [Normal Gait] : normal gait [No Cyanosis] : no cyanosis [No Clubbing] : no clubbing [No Varicosities] : no varicosities [No Rash] : no rash [No Skin Lesions] : no skin lesions [Moves all extremities] : moves all extremities [No Focal Deficits] : no focal deficits [Normal Speech] : normal speech [Alert and Oriented] : alert and oriented [Normal memory] : normal memory [de-identified] : 2/6 JAMIE MCCAULEY  [de-identified] : 1+ bilateral leg edema

## 2022-01-05 ENCOUNTER — RX RENEWAL (OUTPATIENT)
Age: 77
End: 2022-01-05

## 2022-02-02 ENCOUNTER — RX RENEWAL (OUTPATIENT)
Age: 77
End: 2022-02-02

## 2022-02-24 ENCOUNTER — APPOINTMENT (OUTPATIENT)
Dept: CARDIOLOGY | Facility: CLINIC | Age: 77
End: 2022-02-24
Payer: MEDICARE

## 2022-02-24 VITALS
BODY MASS INDEX: 33.84 KG/M2 | SYSTOLIC BLOOD PRESSURE: 169 MMHG | WEIGHT: 185 LBS | TEMPERATURE: 97.6 F | DIASTOLIC BLOOD PRESSURE: 89 MMHG | HEART RATE: 76 BPM | RESPIRATION RATE: 18 BRPM | OXYGEN SATURATION: 96 %

## 2022-02-24 PROCEDURE — 99214 OFFICE O/P EST MOD 30 MIN: CPT

## 2022-02-24 PROCEDURE — 93000 ELECTROCARDIOGRAM COMPLETE: CPT

## 2022-02-24 NOTE — REASON FOR VISIT
[Hyperlipidemia] : hyperlipidemia [Hypertension] : hypertension [Coronary Artery Disease] : coronary artery disease [FreeTextEntry1] : 76 F HTN hyperlipidemia DM with CAD s/p PCI for f/u.\par \par

## 2022-02-24 NOTE — HISTORY OF PRESENT ILLNESS
[FreeTextEntry1] :  \par 1. HTN: on medications. No SE are noted.\par 2. Hyperlipidemia: on statin.  \par 3. DM: on glimepiride and levemir.\par 4. CAD s/p PCI: the patient underwent cardiac cath that showed severe 3v CAD. \par She underwent PCI of the LAD and LCx. She recently underwent PCI of the RCA in .\par  \par She is on ASA and plavix. She denies CP or SOB.\par \par No cough or fevers noted. She has noted L shoulder pain consistent with arthritis.\par \par

## 2022-02-24 NOTE — PHYSICAL EXAM
[Well Developed] : well developed [Well Nourished] : well nourished [No Acute Distress] : no acute distress [Normal Venous Pressure] : normal venous pressure [Normal Conjunctiva] : normal conjunctiva [No Carotid Bruit] : no carotid bruit [Normal S1, S2] : normal S1, S2 [No Rub] : no rub [No Gallop] : no gallop [Clear Lung Fields] : clear lung fields [Good Air Entry] : good air entry [No Respiratory Distress] : no respiratory distress  [Soft] : abdomen soft [Non Tender] : non-tender [No Masses/organomegaly] : no masses/organomegaly [Normal Bowel Sounds] : normal bowel sounds [Normal Gait] : normal gait [No Cyanosis] : no cyanosis [No Clubbing] : no clubbing [No Varicosities] : no varicosities [No Rash] : no rash [No Skin Lesions] : no skin lesions [Moves all extremities] : moves all extremities [No Focal Deficits] : no focal deficits [Normal Speech] : normal speech [Alert and Oriented] : alert and oriented [Normal memory] : normal memory [de-identified] : 2/6 JAMIE MCCAULEY  [de-identified] : 1+ bilateral leg edema

## 2022-03-04 ENCOUNTER — RX RENEWAL (OUTPATIENT)
Age: 77
End: 2022-03-04

## 2022-04-14 ENCOUNTER — APPOINTMENT (OUTPATIENT)
Dept: CARDIOLOGY | Facility: CLINIC | Age: 77
End: 2022-04-14
Payer: MEDICARE

## 2022-04-14 VITALS
HEART RATE: 72 BPM | BODY MASS INDEX: 33.45 KG/M2 | RESPIRATION RATE: 18 BRPM | DIASTOLIC BLOOD PRESSURE: 80 MMHG | WEIGHT: 182.9 LBS | TEMPERATURE: 97.8 F | SYSTOLIC BLOOD PRESSURE: 144 MMHG | OXYGEN SATURATION: 95 %

## 2022-04-14 PROCEDURE — 99214 OFFICE O/P EST MOD 30 MIN: CPT

## 2022-04-14 PROCEDURE — 93000 ELECTROCARDIOGRAM COMPLETE: CPT

## 2022-04-14 NOTE — HISTORY OF PRESENT ILLNESS
[FreeTextEntry1] :  \par 1. HTN: on medications. Controlled.\par 2. Hyperlipidemia: on statin. No muscle pain is noted.\par 3. DM: on glimepiride and levemir.\par 4. CAD s/p PCI: the patient underwent cardiac cath that showed severe 3v CAD. \par She underwent PCI of the LAD and LCx. She recently underwent PCI of the RCA in .\par  \par She is on ASA and plavix.  \par No cough or fevers noted. She has noted L shoulder pain consistent with arthritis.\par \par She received her COVID vaccine yesterday.\par \par She denies any new symptoms.\par

## 2022-04-14 NOTE — REASON FOR VISIT
[Hyperlipidemia] : hyperlipidemia [Hypertension] : hypertension [Coronary Artery Disease] : coronary artery disease [FreeTextEntry1] : 77 F HTN hyperlipidemia DM with CAD s/p PCI for f/u.\par \par

## 2022-04-14 NOTE — DISCUSSION/SUMMARY
[FreeTextEntry1] : 76 F HTN hyperlipidemia DM CAD s/p PCI for f/u.\par Continue ASA 81 and plavix 75 for CAD s/p PCI.\par Continue medications for HTN.\par Continue statin for hyperlipidemia.\par Continue diet and exercise.\par

## 2022-06-16 ENCOUNTER — APPOINTMENT (OUTPATIENT)
Dept: CARDIOLOGY | Facility: CLINIC | Age: 77
End: 2022-06-16
Payer: MEDICARE

## 2022-06-16 VITALS
OXYGEN SATURATION: 97 % | BODY MASS INDEX: 34.48 KG/M2 | SYSTOLIC BLOOD PRESSURE: 161 MMHG | WEIGHT: 188.5 LBS | RESPIRATION RATE: 18 BRPM | DIASTOLIC BLOOD PRESSURE: 77 MMHG | HEART RATE: 70 BPM | TEMPERATURE: 97 F

## 2022-06-16 PROCEDURE — 99214 OFFICE O/P EST MOD 30 MIN: CPT

## 2022-06-16 PROCEDURE — 93000 ELECTROCARDIOGRAM COMPLETE: CPT

## 2022-06-16 RX ORDER — DICLOFENAC EPOLAMINE 0.01 G/1
1.3 SYSTEM TOPICAL
Qty: 60 | Refills: 0 | Status: ACTIVE | COMMUNITY
Start: 2022-06-02

## 2022-06-16 RX ORDER — INSULIN DETEMIR 100 [IU]/ML
100 INJECTION, SOLUTION SUBCUTANEOUS
Qty: 15 | Refills: 0 | Status: ACTIVE | COMMUNITY
Start: 2022-06-02

## 2022-06-16 RX ORDER — CLOTRIMAZOLE 10 MG/G
1 CREAM TOPICAL
Qty: 45 | Refills: 0 | Status: ACTIVE | COMMUNITY
Start: 2022-06-06

## 2022-06-16 RX ORDER — LIDOCAINE 5% 700 MG/1
5 PATCH TOPICAL
Qty: 60 | Refills: 0 | Status: ACTIVE | COMMUNITY
Start: 2022-06-02

## 2022-06-16 NOTE — DISCUSSION/SUMMARY
[FreeTextEntry1] : 77 F HTN hyperlipidemia DM CAD s/p PCI for f/u with leg edema.\par Monitor leg edema.\par Continue ASA 81 and plavix 75 for CAD s/p PCI.\par Continue medications for HTN and hyperlipidemia.\par Continue diet and exercise.\par Patient received her COVID vaccine.\par  \par

## 2022-06-16 NOTE — HISTORY OF PRESENT ILLNESS
[FreeTextEntry1] : \par \par  \par  \par 1. HTN: on medications. Compliant with medications.\par 2. Hyperlipidemia: on statin. No SE are noted.\par 3. DM: on glimepiride and levemir. She reports compliance with medications.\par 4. CAD s/p PCI: the patient underwent cardiac cath that showed severe 3v CAD. \par She underwent PCI of the LAD and LCx. She recently underwent PCI of the RCA in .\par She is on ASA and plavix.  She has mild leg edema bilaterally but denies CP or SOB.\par She received her COVID vaccine.\par  \par   \par \par  \par

## 2022-06-16 NOTE — PHYSICAL EXAM
[Well Developed] : well developed [Well Nourished] : well nourished [No Acute Distress] : no acute distress [Normal Conjunctiva] : normal conjunctiva [Normal Venous Pressure] : normal venous pressure [No Carotid Bruit] : no carotid bruit [Normal S1, S2] : normal S1, S2 [No Rub] : no rub [No Gallop] : no gallop [Clear Lung Fields] : clear lung fields [Good Air Entry] : good air entry [No Respiratory Distress] : no respiratory distress  [Soft] : abdomen soft [Non Tender] : non-tender [No Masses/organomegaly] : no masses/organomegaly [Normal Bowel Sounds] : normal bowel sounds [Normal Gait] : normal gait [No Cyanosis] : no cyanosis [No Clubbing] : no clubbing [No Varicosities] : no varicosities [No Rash] : no rash [No Skin Lesions] : no skin lesions [Moves all extremities] : moves all extremities [No Focal Deficits] : no focal deficits [Normal Speech] : normal speech [Alert and Oriented] : alert and oriented [Normal memory] : normal memory [de-identified] : 2/6 JAMIE MCCAULEY  [de-identified] : 1+ bilateral leg edema

## 2022-07-27 ENCOUNTER — RX RENEWAL (OUTPATIENT)
Age: 77
End: 2022-07-27

## 2022-08-26 VITALS
TEMPERATURE: 98 F | HEART RATE: 60 BPM | SYSTOLIC BLOOD PRESSURE: 164 MMHG | OXYGEN SATURATION: 97 % | DIASTOLIC BLOOD PRESSURE: 71 MMHG | HEIGHT: 62 IN | RESPIRATION RATE: 18 BRPM

## 2022-08-26 RX ORDER — CHLORHEXIDINE GLUCONATE 213 G/1000ML
1 SOLUTION TOPICAL ONCE
Refills: 0 | Status: DISCONTINUED | OUTPATIENT
Start: 2022-09-02 | End: 2022-09-03

## 2022-08-26 NOTE — H&P ADULT - NSHPSOCIALHISTORY_GEN_ALL_CORE
Tobacco: former, quit 20 yrs ago, 3 ppd x 15 yrs  ETOH: denies   Drug use: denies Patient is a former smoker, quit 20 years ago and previously smoked 3 PPD. Patient denies any ETOH use and drug use.     Tobacco: former, quit 20 yrs ago, 3 ppd x 15 yrs  ETOH: denies   Drug use: denies

## 2022-08-26 NOTE — H&P ADULT - HISTORY OF PRESENT ILLNESS
COVID:  Cardiologist: Dr Troy  Pharmacy: [facility] [address] [place] [address]  Escort: none.  staged PCI      78 y/o Armenian speaking F, POOR HISTORIAN, former smoker, PMHx HTN, HLD,  type II DM, CAD with multiple PCIs (last cardiac cath 10/29/21 revealing p/m LAD patent stents, mLCx patent stent, OM2 patent stent, pRCA 90% s/p atherectomy/MEG, mRCA 80% s/p atherectomy/MEG, dRCA 95% s/p MEG) ), asthma (denies any hospitalizations/intubations), colon cancer (s/p chemotherapy/radiation-  16 years ago) who presented to her outpatient Cardiologist, Dr. Troy, c/o    ECHO (09/23/2021): EF 65-70%, mild diastolic dysfunction, and mild MR. In light of patient's risk factors, known h/o CAD, abnormal ___, pt referred for cardiac cath with possible intervention to r/o progressively worsening CAD.  COVID:  Cardiologist: Dr Troy  Pharmacy: [facility] [address] [place] [address]  Escort: none.  staged PCI      78 y/o Latvian speaking F, POOR HISTORIAN, former smoker, PMHx HTN, HLD,  type II DM, CAD with multiple PCIs (last cardiac cath 10/29/21 revealing p/m LAD patent stents, mLCx patent stent, OM2 patent stent, pRCA 90% s/p atherectomy/MEG, mRCA 80% s/p atherectomy/MEG, dRCA 95% s/p MEG) ), asthma (denies any hospitalizations/intubations), colon cancer (s/p chemotherapy/radiation-  16 years ago) who presented to her outpatient Cardiologist, Dr. Troy, c/o    ECHO (09/23/2021): EF 65-70%, mild diastolic dysfunction, and mild MR. In light of patient's risk factors, known h/o CAD, abnormal ___, pt referred for cardiac cath with possible intervention to r/o progressively worsening CAD.  COVID:  Cardiologist: Dr Troy  Pharmacy: [facility] [address] [place] [address]  Escort: none.  staged PCI      78 y/o Tajik speaking F, POOR HISTORIAN, former smoker, PMHx HTN, HLD,  type II DM, CAD with multiple PCIs (last cardiac cath 10/29/21 revealing p/m LAD patent stents, mLCx patent stent, OM2 patent stent, pRCA 90% s/p atherectomy/MEG, mRCA 80% s/p atherectomy/MEG, dRCA 95% s/p MEG) ), asthma (denies any hospitalizations/intubations), colon cancer (s/p chemotherapy/radiation-  16 years ago) who presented to her outpatient Cardiologist, Dr. Troy, c/o    ECHO (09/23/2021): EF 65-70%, mild diastolic dysfunction, and mild MR. In light of patient's risk factors, known h/o CAD, abnormal ___, pt referred for cardiac cath with possible intervention to r/o progressively worsening CAD.  COVID: ________  Cardiologist: Dr. Troy  Pharmacy: [facility] [address] [facility], [place] [address]  Escort: ________    78 y/o Maltese speaking female, POOR HISTORIAN, former smoker, w/ PMHx HTN, HLD, Typee II DM, CAD (s/p prior PCI LAD, LCX, and staged PCI RCA), Asthma, and colon cancer (s/p chemo and radiation, 16 years ago), who presented to outpatient cardiologist, Dr. Troy, endorsing worsening chest pain and SOB w/ ______, relieved w/ _____. Patient had an initial cardiac catheterization on 10/01/2021 w/ CSI/MEG ostial LAD, CSI/MEG mid LAD, and MEG x 2 OM2; subsequently returned on 10/29/2021 for staged intervention w/ MEG proximal RCA, MEG mid RCA, MEG distal RCA. Patient currentlyt endorses symptoms are _____ to when she previousl required stents. CCTA (08/22/2022) revealed up to moderate stenosis within the proximal LCx and mild stenosis within the LAD, first and secon diagonal branches and RCA; patent LAD, RCA, and LCx/OM2 stents (note the LAD stent extends into the distal LM and covers the ostium LCx), and mild bibasilar peripheral scarring. Echocardiogram (08/10/2022) showed normal LV size w/ normal global wall motion and mild AR.     In light of patient's risk factors, CCS Class ____ anginal symptoms, and abnormal CCTA results, patient now presents for cardiac catheterization w/ possible intervention. COVID: ________  Cardiologist: Dr. Troy  Pharmacy: [facility] [address] [facility], [place] [address]  Escort: ________    78 y/o Uzbek speaking female, POOR HISTORIAN, former smoker, w/ PMHx HTN, HLD, Typee II DM, CAD (s/p prior PCI LAD, LCX, and staged PCI RCA), Asthma, and colon cancer (s/p chemo and radiation, 16 years ago), who presented to outpatient cardiologist, Dr. Troy, endorsing worsening chest pain and SOB w/ ______, relieved w/ _____. Patient had an initial cardiac catheterization on 10/01/2021 w/ CSI/MEG ostial LAD, CSI/MEG mid LAD, and MEG x 2 OM2; subsequently returned on 10/29/2021 for staged intervention w/ MEG proximal RCA, MEG mid RCA, MEG distal RCA. Patient currentlyt endorses symptoms are _____ to when she previousl required stents. CCTA (08/22/2022) revealed up to moderate stenosis within the proximal LCx and mild stenosis within the LAD, first and secon diagonal branches and RCA; patent LAD, RCA, and LCx/OM2 stents (note the LAD stent extends into the distal LM and covers the ostium LCx), and mild bibasilar peripheral scarring. Echocardiogram (08/10/2022) showed normal LV size w/ normal global wall motion and mild AR.     In light of patient's risk factors, CCS Class ____ anginal symptoms, and abnormal CCTA results, patient now presents for cardiac catheterization w/ possible intervention. COVID: ________  Cardiologist: Dr. Troy  Pharmacy: [facility] [address] [facility], [place] [address]  Escort: ________    76 y/o Yakut speaking female, POOR HISTORIAN, former smoker, w/ PMHx HTN, HLD, Typee II DM, CAD (s/p prior PCI LAD, LCX, and staged PCI RCA), Asthma, and colon cancer (s/p chemo and radiation, 16 years ago), who presented to outpatient cardiologist, Dr. Troy, endorsing worsening chest pain and SOB w/ ______, relieved w/ _____. Patient had an initial cardiac catheterization on 10/01/2021 w/ CSI/MEG ostial LAD, CSI/MEG mid LAD, and MEG x 2 OM2; subsequently returned on 10/29/2021 for staged intervention w/ MEG proximal RCA, MEG mid RCA, MEG distal RCA. Patient currentlyt endorses symptoms are _____ to when she previousl required stents. CCTA (08/22/2022) revealed up to moderate stenosis within the proximal LCx and mild stenosis within the LAD, first and secon diagonal branches and RCA; patent LAD, RCA, and LCx/OM2 stents (note the LAD stent extends into the distal LM and covers the ostium LCx), and mild bibasilar peripheral scarring. Echocardiogram (08/10/2022) showed normal LV size w/ normal global wall motion and mild AR.     In light of patient's risk factors, CCS Class ____ anginal symptoms, and abnormal CCTA results, patient now presents for cardiac catheterization w/ possible intervention. COVID: Negative in HIE on 8/30/22  Cardiologist: Dr. Troy  Pharmacy: [facility] [address] [place] [address]  Escort: none    78 y/o Croatian speaking female, POOR HISTORIAN, former smoker, w/ PMHx HTN, HLD, Typee II DM, CAD (s/p prior PCI LAD, LCX, and staged PCI RCA), Asthma, and colon cancer (s/p chemo and radiation, 16 years ago), who presented to outpatient cardiologist, Dr. Troy, endorsing worsening chest pain and SOB, symptoms relieved with rest. Patient had an initial cardiac catheterization on 10/01/2021 w/ CSI/MEG ostial LAD, CSI/MEG mid LAD, and MEG x 2 OM2; subsequently returned on 10/29/2021 for staged intervention w/ MEG proximal RCA, MEG mid RCA, MEG distal RCA.  Patient currently endorses symptoms are similar to when she previously  required stents. CCTA (08/22/2022) revealed up to moderate stenosis within the proximal LCx and mild stenosis within the LAD, first and secon diagonal branches and RCA; patent LAD, RCA, and LCx/OM2 stents (note the LAD stent extends into the distal LM and covers the ostium LCx), and mild bibasilar peripheral scarring. Echocardiogram (08/10/2022) showed normal LV size w/ normal global wall motion and mild AR.     In light of patient's risk factors, CCS Class III anginal symptoms, and abnormal CCTA results, patient now presents for cardiac catheterization w/ possible intervention. COVID: Negative in HIE on 8/30/22  Cardiologist: Dr. Troy  Pharmacy: [facility] [address] [place] [address]  Escort: none    78 y/o Khmer speaking female, POOR HISTORIAN, former smoker, w/ PMHx HTN, HLD, Typee II DM, CAD (s/p prior PCI LAD, LCX, and staged PCI RCA), Asthma, and colon cancer (s/p chemo and radiation, 16 years ago), who presented to outpatient cardiologist, Dr. Troy, endorsing worsening chest pain and SOB, symptoms relieved with rest. Patient had an initial cardiac catheterization on 10/01/2021 w/ CSI/MEG ostial LAD, CSI/MEG mid LAD, and MEG x 2 OM2; subsequently returned on 10/29/2021 for staged intervention w/ MEG proximal RCA, MEG mid RCA, MEG distal RCA.  Patient currently endorses symptoms are similar to when she previously  required stents. CCTA (08/22/2022) revealed up to moderate stenosis within the proximal LCx and mild stenosis within the LAD, first and secon diagonal branches and RCA; patent LAD, RCA, and LCx/OM2 stents (note the LAD stent extends into the distal LM and covers the ostium LCx), and mild bibasilar peripheral scarring. Echocardiogram (08/10/2022) showed normal LV size w/ normal global wall motion and mild AR.     In light of patient's risk factors, CCS Class III anginal symptoms, and abnormal CCTA results, patient now presents for cardiac catheterization w/ possible intervention. COVID: Negative in HIE on 8/30/22  Cardiologist: Dr. Troy  Pharmacy: [facility] [address] [place] [address]  Escort: none    78 y/o Lithuanian speaking female, POOR HISTORIAN, former smoker, w/ PMHx HTN, HLD, Typee II DM, CAD (s/p prior PCI LAD, LCX, and staged PCI RCA), Asthma, and colon cancer (s/p chemo and radiation, 16 years ago), who presented to outpatient cardiologist, Dr. Troy, endorsing worsening chest pain and SOB, symptoms relieved with rest. Patient had an initial cardiac catheterization on 10/01/2021 w/ CSI/MEG ostial LAD, CSI/MEG mid LAD, and MEG x 2 OM2; subsequently returned on 10/29/2021 for staged intervention w/ MEG proximal RCA, MEG mid RCA, MEG distal RCA.  Patient currently endorses symptoms are similar to when she previously  required stents. CCTA (08/22/2022) revealed up to moderate stenosis within the proximal LCx and mild stenosis within the LAD, first and secon diagonal branches and RCA; patent LAD, RCA, and LCx/OM2 stents (note the LAD stent extends into the distal LM and covers the ostium LCx), and mild bibasilar peripheral scarring. Echocardiogram (08/10/2022) showed normal LV size w/ normal global wall motion and mild AR.     In light of patient's risk factors, CCS Class III anginal symptoms, and abnormal CCTA results, patient now presents for cardiac catheterization w/ possible intervention.

## 2022-08-26 NOTE — H&P ADULT - NSHPLABSRESULTS_GEN_ALL_CORE
12.8   4.32  )-----------( 193      ( 02 Sep 2022 09:27 )             39.3       09-02    140  |  102  |  23  ----------------------------<  200<H>  3.8   |  29  |  0.71    Ca    9.1      02 Sep 2022 09:27    TPro  7.0  /  Alb  4.8  /  TBili  0.5  /  DBili  x   /  AST  15  /  ALT  14  /  AlkPhos  54  09-02      PT/INR - ( 02 Sep 2022 09:27 )   PT: 10.7 sec;   INR: 0.90          PTT - ( 02 Sep 2022 09:27 )  PTT:29.7 sec    CARDIAC MARKERS ( 02 Sep 2022 09:27 )  x     / x     / 153 U/L / x     / 3.8 ng/mL

## 2022-08-26 NOTE — H&P ADULT - ASSESSMENT
76 y/o English speaking female, POOR HISTORIAN, former smoker, w/ PMHx HTN, HLD, Typee II DM, CAD (s/p prior PCI LAD, LCX, and staged PCI RCA on 10/2021, Asthma, and colon cancer (s/p chemo and radiation, 16 years ago), who presented to outpatient cardiologist, Dr. Troy, endorsing class III anginal symptoms, similar to symptoms prior to last stents.  Pt found with subsequent abnormal CCTA and now presents for recommended C with possible intervention with Dr. Garcia.       EKG:					  ASA _____				Mallampati class: _________	            Anginal Class: _________    -Allergy Status:  NKDA  -H/H = 12.8/39.3. Pt nies BRBPR, hematuria, hematochezia, melena. Pt given home dose ASA/Plavix:     -BUN/Cr = 23/0.7, EF preserved.  Euvolemic on exam. IV NS @ Bolus 250cc and 75cc started pre procedure    Sedation Plan:  Moderate     Patient Is Suitable Candidate For Sedation:  Yes     Risks & benefits of procedure and sedation and risks and benefits for the alternative therapy have been explained to the patient in layman’s terms including but not limited to: allergic reaction, bleeding, infection, arrhythmia, respiratory compromise, renal and vascular compromise, limb damage, MI, CVA, emergent CABG/Vascular Surgery and death. Informed consent obtained and in chart.   78 y/o Czech speaking female, POOR HISTORIAN, former smoker, w/ PMHx HTN, HLD, Typee II DM, CAD (s/p prior PCI LAD, LCX, and staged PCI RCA on 10/2021, Asthma, and colon cancer (s/p chemo and radiation, 16 years ago), who presented to outpatient cardiologist, Dr. Troy, endorsing class III anginal symptoms, similar to symptoms prior to last stents.  Pt found with subsequent abnormal CCTA and now presents for recommended C with possible intervention with Dr. Garcia.       EKG:					  ASA _____				Mallampati class: _________	            Anginal Class: _________    -Allergy Status:  NKDA  -H/H = 12.8/39.3. Pt nies BRBPR, hematuria, hematochezia, melena. Pt given home dose ASA/Plavix:     -BUN/Cr = 23/0.7, EF preserved.  Euvolemic on exam. IV NS @ Bolus 250cc and 75cc started pre procedure    Sedation Plan:  Moderate     Patient Is Suitable Candidate For Sedation:  Yes     Risks & benefits of procedure and sedation and risks and benefits for the alternative therapy have been explained to the patient in layman’s terms including but not limited to: allergic reaction, bleeding, infection, arrhythmia, respiratory compromise, renal and vascular compromise, limb damage, MI, CVA, emergent CABG/Vascular Surgery and death. Informed consent obtained and in chart.   78 y/o Azeri speaking female, POOR HISTORIAN, former smoker, w/ PMHx HTN, HLD, Typee II DM, CAD (s/p prior PCI LAD, LCX, and staged PCI RCA on 10/2021, Asthma, and colon cancer (s/p chemo and radiation, 16 years ago), who presented to outpatient cardiologist, Dr. Troy, endorsing class III anginal symptoms, similar to symptoms prior to last stents.  Pt found with subsequent abnormal CCTA and now presents for recommended C with possible intervention with Dr. Garcia.       EKG:					  ASA _____				Mallampati class: _________	            Anginal Class: _________    -Allergy Status:  NKDA  -H/H = 12.8/39.3. Pt nies BRBPR, hematuria, hematochezia, melena. Pt given home dose ASA/Plavix:     -BUN/Cr = 23/0.7, EF preserved.  Euvolemic on exam. IV NS @ Bolus 250cc and 75cc started pre procedure    Sedation Plan:  Moderate     Patient Is Suitable Candidate For Sedation:  Yes     Risks & benefits of procedure and sedation and risks and benefits for the alternative therapy have been explained to the patient in layman’s terms including but not limited to: allergic reaction, bleeding, infection, arrhythmia, respiratory compromise, renal and vascular compromise, limb damage, MI, CVA, emergent CABG/Vascular Surgery and death. Informed consent obtained and in chart.   78 y/o Ukrainian speaking female, POOR HISTORIAN, former smoker, w/ PMHx HTN, HLD, Typee II DM, CAD (s/p prior PCI LAD, LCX, and staged PCI RCA on 10/2021, Asthma, and colon cancer (s/p chemo and radiation, 16 years ago), who presented to outpatient cardiologist, Dr. Troy, endorsing class III anginal symptoms, similar to symptoms prior to last stents.  Pt found with subsequent abnormal CCTA and now presents for recommended C with possible intervention with Dr. Garcia.     EKG:  NSR 61bpm, no ischemic changes  				  ASA:  III	           Mallampati class: II	            Anginal Class: III    -Allergy Status:  NKDA  -H/H = 12.8/39.3. Pt nies BRBPR, hematuria, hematochezia, melena. Pt given home dose ASA/Plavix:     -BUN/Cr = 23/0.7, EF preserved.  Euvolemic on exam. IV NS @ Bolus 250cc and 75cc started pre procedure    Sedation Plan:  Moderate     Patient Is Suitable Candidate For Sedation:  Yes     Risks & benefits of procedure and sedation and risks and benefits for the alternative therapy have been explained to the patient in layman’s terms including but not limited to: allergic reaction, bleeding, infection, arrhythmia, respiratory compromise, renal and vascular compromise, limb damage, MI, CVA, emergent CABG/Vascular Surgery and death. Informed consent obtained and in chart.   78 y/o Uzbek speaking female, POOR HISTORIAN, former smoker, w/ PMHx HTN, HLD, Typee II DM, CAD (s/p prior PCI LAD, LCX, and staged PCI RCA on 10/2021, Asthma, and colon cancer (s/p chemo and radiation, 16 years ago), who presented to outpatient cardiologist, Dr. Tryo, endorsing class III anginal symptoms, similar to symptoms prior to last stents.  Pt found with subsequent abnormal CCTA and now presents for recommended C with possible intervention with Dr. Garcia.     EKG:  NSR 61bpm, no ischemic changes  				  ASA:  III	           Mallampati class: II	            Anginal Class: III    -Allergy Status:  NKDA  -H/H = 12.8/39.3. Pt nies BRBPR, hematuria, hematochezia, melena. Pt given home dose ASA/Plavix:     -BUN/Cr = 23/0.7, EF preserved.  Euvolemic on exam. IV NS @ Bolus 250cc and 75cc started pre procedure    Sedation Plan:  Moderate     Patient Is Suitable Candidate For Sedation:  Yes     Risks & benefits of procedure and sedation and risks and benefits for the alternative therapy have been explained to the patient in layman’s terms including but not limited to: allergic reaction, bleeding, infection, arrhythmia, respiratory compromise, renal and vascular compromise, limb damage, MI, CVA, emergent CABG/Vascular Surgery and death. Informed consent obtained and in chart.   78 y/o Georgian speaking female, POOR HISTORIAN, former smoker, w/ PMHx HTN, HLD, Typee II DM, CAD (s/p prior PCI LAD, LCX, and staged PCI RCA on 10/2021, Asthma, and colon cancer (s/p chemo and radiation, 16 years ago), who presented to outpatient cardiologist, Dr. Troy, endorsing class III anginal symptoms, similar to symptoms prior to last stents.  Pt found with subsequent abnormal CCTA and now presents for recommended C with possible intervention with Dr. Garcia.     EKG:  NSR 61bpm, no ischemic changes  				  ASA:  III	           Mallampati class: II	            Anginal Class: III    -Allergy Status:  NKDA  -H/H = 12.8/39.3. Pt nies BRBPR, hematuria, hematochezia, melena. Pt given home dose ASA/Plavix:     -BUN/Cr = 23/0.7, EF preserved.  Euvolemic on exam. IV NS @ Bolus 250cc and 75cc started pre procedure    Sedation Plan:  Moderate     Patient Is Suitable Candidate For Sedation:  Yes     Risks & benefits of procedure and sedation and risks and benefits for the alternative therapy have been explained to the patient in layman’s terms including but not limited to: allergic reaction, bleeding, infection, arrhythmia, respiratory compromise, renal and vascular compromise, limb damage, MI, CVA, emergent CABG/Vascular Surgery and death. Informed consent obtained and in chart.

## 2022-09-02 ENCOUNTER — INPATIENT (INPATIENT)
Facility: HOSPITAL | Age: 77
LOS: 0 days | Discharge: HOME | DRG: 247 | End: 2022-09-03
Attending: INTERNAL MEDICINE | Admitting: INTERNAL MEDICINE
Payer: MEDICARE

## 2022-09-02 ENCOUNTER — RX RENEWAL (OUTPATIENT)
Age: 77
End: 2022-09-02

## 2022-09-02 DIAGNOSIS — Z98.890 OTHER SPECIFIED POSTPROCEDURAL STATES: Chronic | ICD-10-CM

## 2022-09-02 DIAGNOSIS — Z98.49 CATARACT EXTRACTION STATUS, UNSPECIFIED EYE: Chronic | ICD-10-CM

## 2022-09-02 LAB
A1C WITH ESTIMATED AVERAGE GLUCOSE RESULT: 9.8 % — HIGH (ref 4–5.6)
ALBUMIN SERPL ELPH-MCNC: 4.8 G/DL — SIGNIFICANT CHANGE UP (ref 3.3–5)
ALP SERPL-CCNC: 54 U/L — SIGNIFICANT CHANGE UP (ref 40–120)
ALT FLD-CCNC: 14 U/L — SIGNIFICANT CHANGE UP (ref 10–45)
ANION GAP SERPL CALC-SCNC: 9 MMOL/L — SIGNIFICANT CHANGE UP (ref 5–17)
APTT BLD: 29.7 SEC — SIGNIFICANT CHANGE UP (ref 27.5–35.5)
AST SERPL-CCNC: 15 U/L — SIGNIFICANT CHANGE UP (ref 10–40)
BASOPHILS # BLD AUTO: 0.03 K/UL — SIGNIFICANT CHANGE UP (ref 0–0.2)
BASOPHILS NFR BLD AUTO: 0.7 % — SIGNIFICANT CHANGE UP (ref 0–2)
BILIRUB SERPL-MCNC: 0.5 MG/DL — SIGNIFICANT CHANGE UP (ref 0.2–1.2)
BUN SERPL-MCNC: 23 MG/DL — SIGNIFICANT CHANGE UP (ref 7–23)
CALCIUM SERPL-MCNC: 9.1 MG/DL — SIGNIFICANT CHANGE UP (ref 8.4–10.5)
CHLORIDE SERPL-SCNC: 102 MMOL/L — SIGNIFICANT CHANGE UP (ref 96–108)
CHOLEST SERPL-MCNC: 160 MG/DL — SIGNIFICANT CHANGE UP
CK MB CFR SERPL CALC: 3.8 NG/ML — SIGNIFICANT CHANGE UP (ref 0–6.7)
CK SERPL-CCNC: 153 U/L — SIGNIFICANT CHANGE UP (ref 25–170)
CO2 SERPL-SCNC: 29 MMOL/L — SIGNIFICANT CHANGE UP (ref 22–31)
CREAT SERPL-MCNC: 0.71 MG/DL — SIGNIFICANT CHANGE UP (ref 0.5–1.3)
EGFR: 88 ML/MIN/1.73M2 — SIGNIFICANT CHANGE UP
EOSINOPHIL # BLD AUTO: 0.06 K/UL — SIGNIFICANT CHANGE UP (ref 0–0.5)
EOSINOPHIL NFR BLD AUTO: 1.4 % — SIGNIFICANT CHANGE UP (ref 0–6)
ESTIMATED AVERAGE GLUCOSE: 235 MG/DL — HIGH (ref 68–114)
GLUCOSE BLDC GLUCOMTR-MCNC: 146 MG/DL — HIGH (ref 70–99)
GLUCOSE BLDC GLUCOMTR-MCNC: 161 MG/DL — HIGH (ref 70–99)
GLUCOSE BLDC GLUCOMTR-MCNC: 202 MG/DL — HIGH (ref 70–99)
GLUCOSE SERPL-MCNC: 200 MG/DL — HIGH (ref 70–99)
HCT VFR BLD CALC: 39.3 % — SIGNIFICANT CHANGE UP (ref 34.5–45)
HDLC SERPL-MCNC: 54 MG/DL — SIGNIFICANT CHANGE UP
HGB BLD-MCNC: 12.8 G/DL — SIGNIFICANT CHANGE UP (ref 11.5–15.5)
IMM GRANULOCYTES NFR BLD AUTO: 0 % — SIGNIFICANT CHANGE UP (ref 0–1.5)
INR BLD: 0.9 — SIGNIFICANT CHANGE UP (ref 0.88–1.16)
LIPID PNL WITH DIRECT LDL SERPL: 87 MG/DL — SIGNIFICANT CHANGE UP
LYMPHOCYTES # BLD AUTO: 1.45 K/UL — SIGNIFICANT CHANGE UP (ref 1–3.3)
LYMPHOCYTES # BLD AUTO: 33.6 % — SIGNIFICANT CHANGE UP (ref 13–44)
MCHC RBC-ENTMCNC: 27.8 PG — SIGNIFICANT CHANGE UP (ref 27–34)
MCHC RBC-ENTMCNC: 32.6 GM/DL — SIGNIFICANT CHANGE UP (ref 32–36)
MCV RBC AUTO: 85.2 FL — SIGNIFICANT CHANGE UP (ref 80–100)
MONOCYTES # BLD AUTO: 0.27 K/UL — SIGNIFICANT CHANGE UP (ref 0–0.9)
MONOCYTES NFR BLD AUTO: 6.3 % — SIGNIFICANT CHANGE UP (ref 2–14)
NEUTROPHILS # BLD AUTO: 2.51 K/UL — SIGNIFICANT CHANGE UP (ref 1.8–7.4)
NEUTROPHILS NFR BLD AUTO: 58 % — SIGNIFICANT CHANGE UP (ref 43–77)
NON HDL CHOLESTEROL: 106 MG/DL — SIGNIFICANT CHANGE UP
NRBC # BLD: 0 /100 WBCS — SIGNIFICANT CHANGE UP (ref 0–0)
PLATELET # BLD AUTO: 193 K/UL — SIGNIFICANT CHANGE UP (ref 150–400)
POTASSIUM SERPL-MCNC: 3.8 MMOL/L — SIGNIFICANT CHANGE UP (ref 3.5–5.3)
POTASSIUM SERPL-SCNC: 3.8 MMOL/L — SIGNIFICANT CHANGE UP (ref 3.5–5.3)
PROT SERPL-MCNC: 7 G/DL — SIGNIFICANT CHANGE UP (ref 6–8.3)
PROTHROM AB SERPL-ACNC: 10.7 SEC — SIGNIFICANT CHANGE UP (ref 10.5–13.4)
RBC # BLD: 4.61 M/UL — SIGNIFICANT CHANGE UP (ref 3.8–5.2)
RBC # FLD: 13 % — SIGNIFICANT CHANGE UP (ref 10.3–14.5)
SODIUM SERPL-SCNC: 140 MMOL/L — SIGNIFICANT CHANGE UP (ref 135–145)
TRIGL SERPL-MCNC: 93 MG/DL — SIGNIFICANT CHANGE UP
WBC # BLD: 4.32 K/UL — SIGNIFICANT CHANGE UP (ref 3.8–10.5)
WBC # FLD AUTO: 4.32 K/UL — SIGNIFICANT CHANGE UP (ref 3.8–10.5)

## 2022-09-02 PROCEDURE — 93010 ELECTROCARDIOGRAM REPORT: CPT

## 2022-09-02 PROCEDURE — 92928 PRQ TCAT PLMT NTRAC ST 1 LES: CPT | Mod: LC

## 2022-09-02 PROCEDURE — 99152 MOD SED SAME PHYS/QHP 5/>YRS: CPT

## 2022-09-02 PROCEDURE — 93458 L HRT ARTERY/VENTRICLE ANGIO: CPT | Mod: 26,59

## 2022-09-02 RX ORDER — GABAPENTIN 400 MG/1
600 CAPSULE ORAL THREE TIMES A DAY
Refills: 0 | Status: DISCONTINUED | OUTPATIENT
Start: 2022-09-02 | End: 2022-09-03

## 2022-09-02 RX ORDER — DEXTROSE 50 % IN WATER 50 %
15 SYRINGE (ML) INTRAVENOUS ONCE
Refills: 0 | Status: DISCONTINUED | OUTPATIENT
Start: 2022-09-02 | End: 2022-09-03

## 2022-09-02 RX ORDER — PANTOPRAZOLE SODIUM 20 MG/1
40 TABLET, DELAYED RELEASE ORAL
Refills: 0 | Status: DISCONTINUED | OUTPATIENT
Start: 2022-09-02 | End: 2022-09-03

## 2022-09-02 RX ORDER — CLOPIDOGREL BISULFATE 75 MG/1
75 TABLET, FILM COATED ORAL ONCE
Refills: 0 | Status: COMPLETED | OUTPATIENT
Start: 2022-09-02 | End: 2022-09-02

## 2022-09-02 RX ORDER — CLOPIDOGREL BISULFATE 75 MG/1
75 TABLET, FILM COATED ORAL DAILY
Refills: 0 | Status: DISCONTINUED | OUTPATIENT
Start: 2022-09-03 | End: 2022-09-03

## 2022-09-02 RX ORDER — SODIUM CHLORIDE 9 MG/ML
1000 INJECTION, SOLUTION INTRAVENOUS
Refills: 0 | Status: DISCONTINUED | OUTPATIENT
Start: 2022-09-02 | End: 2022-09-03

## 2022-09-02 RX ORDER — LOSARTAN POTASSIUM 100 MG/1
100 TABLET, FILM COATED ORAL DAILY
Refills: 0 | Status: DISCONTINUED | OUTPATIENT
Start: 2022-09-02 | End: 2022-09-03

## 2022-09-02 RX ORDER — INFLUENZA VIRUS VACCINE 15; 15; 15; 15 UG/.5ML; UG/.5ML; UG/.5ML; UG/.5ML
0.7 SUSPENSION INTRAMUSCULAR ONCE
Refills: 0 | Status: DISCONTINUED | OUTPATIENT
Start: 2022-09-02 | End: 2022-09-03

## 2022-09-02 RX ORDER — GLUCAGON INJECTION, SOLUTION 0.5 MG/.1ML
1 INJECTION, SOLUTION SUBCUTANEOUS ONCE
Refills: 0 | Status: DISCONTINUED | OUTPATIENT
Start: 2022-09-02 | End: 2022-09-03

## 2022-09-02 RX ORDER — ASPIRIN/CALCIUM CARB/MAGNESIUM 324 MG
81 TABLET ORAL ONCE
Refills: 0 | Status: COMPLETED | OUTPATIENT
Start: 2022-09-02 | End: 2022-09-02

## 2022-09-02 RX ORDER — SODIUM CHLORIDE 9 MG/ML
500 INJECTION INTRAMUSCULAR; INTRAVENOUS; SUBCUTANEOUS
Refills: 0 | Status: DISCONTINUED | OUTPATIENT
Start: 2022-09-02 | End: 2022-09-03

## 2022-09-02 RX ORDER — POTASSIUM CHLORIDE 20 MEQ
40 PACKET (EA) ORAL ONCE
Refills: 0 | Status: COMPLETED | OUTPATIENT
Start: 2022-09-02 | End: 2022-09-02

## 2022-09-02 RX ORDER — OXYBUTYNIN CHLORIDE 5 MG
5 TABLET ORAL DAILY
Refills: 0 | Status: DISCONTINUED | OUTPATIENT
Start: 2022-09-02 | End: 2022-09-03

## 2022-09-02 RX ORDER — NITROGLYCERIN 0.4 MG/1
0.4 TABLET SUBLINGUAL
Qty: 100 | Refills: 0 | Status: ACTIVE | COMMUNITY
Start: 2021-09-23 | End: 1900-01-01

## 2022-09-02 RX ORDER — SODIUM CHLORIDE 9 MG/ML
250 INJECTION INTRAMUSCULAR; INTRAVENOUS; SUBCUTANEOUS ONCE
Refills: 0 | Status: DISCONTINUED | OUTPATIENT
Start: 2022-09-02 | End: 2022-09-02

## 2022-09-02 RX ORDER — DEXTROSE 50 % IN WATER 50 %
12.5 SYRINGE (ML) INTRAVENOUS ONCE
Refills: 0 | Status: DISCONTINUED | OUTPATIENT
Start: 2022-09-02 | End: 2022-09-03

## 2022-09-02 RX ORDER — DEXTROSE 50 % IN WATER 50 %
25 SYRINGE (ML) INTRAVENOUS ONCE
Refills: 0 | Status: DISCONTINUED | OUTPATIENT
Start: 2022-09-02 | End: 2022-09-03

## 2022-09-02 RX ORDER — ISOSORBIDE MONONITRATE 60 MG/1
60 TABLET, EXTENDED RELEASE ORAL DAILY
Refills: 0 | Status: DISCONTINUED | OUTPATIENT
Start: 2022-09-02 | End: 2022-09-03

## 2022-09-02 RX ORDER — MAGNESIUM OXIDE 400 MG ORAL TABLET 241.3 MG
400 TABLET ORAL DAILY
Refills: 0 | Status: DISCONTINUED | OUTPATIENT
Start: 2022-09-02 | End: 2022-09-03

## 2022-09-02 RX ORDER — INSULIN LISPRO 100/ML
VIAL (ML) SUBCUTANEOUS
Refills: 0 | Status: DISCONTINUED | OUTPATIENT
Start: 2022-09-02 | End: 2022-09-03

## 2022-09-02 RX ORDER — SODIUM CHLORIDE 9 MG/ML
500 INJECTION INTRAMUSCULAR; INTRAVENOUS; SUBCUTANEOUS
Refills: 0 | Status: DISCONTINUED | OUTPATIENT
Start: 2022-09-02 | End: 2022-09-02

## 2022-09-02 RX ORDER — LORATADINE 10 MG/1
10 TABLET ORAL DAILY
Refills: 0 | Status: DISCONTINUED | OUTPATIENT
Start: 2022-09-02 | End: 2022-09-03

## 2022-09-02 RX ORDER — SENNA PLUS 8.6 MG/1
1 TABLET ORAL AT BEDTIME
Refills: 0 | Status: DISCONTINUED | OUTPATIENT
Start: 2022-09-02 | End: 2022-09-03

## 2022-09-02 RX ORDER — SIMVASTATIN 20 MG/1
20 TABLET, FILM COATED ORAL AT BEDTIME
Refills: 0 | Status: DISCONTINUED | OUTPATIENT
Start: 2022-09-02 | End: 2022-09-03

## 2022-09-02 RX ORDER — ASPIRIN/CALCIUM CARB/MAGNESIUM 324 MG
81 TABLET ORAL DAILY
Refills: 0 | Status: DISCONTINUED | OUTPATIENT
Start: 2022-09-02 | End: 2022-09-03

## 2022-09-02 RX ADMIN — SODIUM CHLORIDE 150 MILLILITER(S): 9 INJECTION INTRAMUSCULAR; INTRAVENOUS; SUBCUTANEOUS at 14:55

## 2022-09-02 RX ADMIN — Medication 81 MILLIGRAM(S): at 11:00

## 2022-09-02 RX ADMIN — Medication 4: at 23:06

## 2022-09-02 RX ADMIN — ISOSORBIDE MONONITRATE 60 MILLIGRAM(S): 60 TABLET, EXTENDED RELEASE ORAL at 18:25

## 2022-09-02 RX ADMIN — Medication 2: at 18:25

## 2022-09-02 RX ADMIN — Medication 5 MILLIGRAM(S): at 23:08

## 2022-09-02 RX ADMIN — MAGNESIUM OXIDE 400 MG ORAL TABLET 400 MILLIGRAM(S): 241.3 TABLET ORAL at 23:08

## 2022-09-02 RX ADMIN — SIMVASTATIN 20 MILLIGRAM(S): 20 TABLET, FILM COATED ORAL at 23:08

## 2022-09-02 RX ADMIN — Medication 40 MILLIEQUIVALENT(S): at 11:00

## 2022-09-02 RX ADMIN — LOSARTAN POTASSIUM 100 MILLIGRAM(S): 100 TABLET, FILM COATED ORAL at 18:25

## 2022-09-02 RX ADMIN — SENNA PLUS 1 TABLET(S): 8.6 TABLET ORAL at 23:08

## 2022-09-02 RX ADMIN — CLOPIDOGREL BISULFATE 75 MILLIGRAM(S): 75 TABLET, FILM COATED ORAL at 11:00

## 2022-09-02 NOTE — PATIENT PROFILE ADULT - FALL HARM RISK - HARM RISK INTERVENTIONS
Assistance with ambulation/Assistance OOB with selected safe patient handling equipment/Communicate Risk of Fall with Harm to all staff/Discuss with provider need for PT consult/Monitor gait and stability/Provide patient with walking aids - walker, cane, crutches/Reinforce activity limits and safety measures with patient and family/Sit up slowly, dangle for a short time, stand at bedside before walking/Tailored Fall Risk Interventions/Use of alarms - bed, chair and/or voice tab/Visual Cue: Yellow wristband and red socks/Bed in lowest position, wheels locked, appropriate side rails in place/Call bell, personal items and telephone in reach/Instruct patient to call for assistance before getting out of bed or chair/Non-slip footwear when patient is out of bed/[place] to call system/Physically safe environment - no spills, clutter or unnecessary equipment/Purposeful Proactive Rounding/Room/bathroom lighting operational, light cord in reach Assistance with ambulation/Assistance OOB with selected safe patient handling equipment/Communicate Risk of Fall with Harm to all staff/Discuss with provider need for PT consult/Monitor gait and stability/Provide patient with walking aids - walker, cane, crutches/Reinforce activity limits and safety measures with patient and family/Sit up slowly, dangle for a short time, stand at bedside before walking/Tailored Fall Risk Interventions/Use of alarms - bed, chair and/or voice tab/Visual Cue: Yellow wristband and red socks/Bed in lowest position, wheels locked, appropriate side rails in place/Call bell, personal items and telephone in reach/Instruct patient to call for assistance before getting out of bed or chair/Non-slip footwear when patient is out of bed/[place] to call system/Physically safe environment - no spills, clutter or unnecessary equipment/Purposeful Proactive Rounding/Room/bathroom lighting operational, light cord in reach Assistance with ambulation/Assistance OOB with selected safe patient handling equipment/Communicate Risk of Fall with Harm to all staff/Discuss with provider need for PT consult/Monitor gait and stability/Provide patient with walking aids - walker, cane, crutches/Reinforce activity limits and safety measures with patient and family/Sit up slowly, dangle for a short time, stand at bedside before walking/Tailored Fall Risk Interventions/Use of alarms - bed, chair and/or voice tab/Visual Cue: Yellow wristband and red socks/Bed in lowest position, wheels locked, appropriate side rails in place/Call bell, personal items and telephone in reach/Instruct patient to call for assistance before getting out of bed or chair/Non-slip footwear when patient is out of bed/[place] to call system/Physically safe environment - no spills, clutter or unnecessary equipment/Purposeful Proactive Rounding/Room/bathroom lighting operational, light cord in reach

## 2022-09-03 ENCOUNTER — TRANSCRIPTION ENCOUNTER (OUTPATIENT)
Age: 77
End: 2022-09-03

## 2022-09-03 VITALS — TEMPERATURE: 98 F

## 2022-09-03 LAB
ANION GAP SERPL CALC-SCNC: 5 MMOL/L — SIGNIFICANT CHANGE UP (ref 5–17)
BASOPHILS # BLD AUTO: 0.04 K/UL — SIGNIFICANT CHANGE UP (ref 0–0.2)
BASOPHILS NFR BLD AUTO: 0.8 % — SIGNIFICANT CHANGE UP (ref 0–2)
BUN SERPL-MCNC: 15 MG/DL — SIGNIFICANT CHANGE UP (ref 7–23)
CALCIUM SERPL-MCNC: 9.3 MG/DL — SIGNIFICANT CHANGE UP (ref 8.4–10.5)
CHLORIDE SERPL-SCNC: 105 MMOL/L — SIGNIFICANT CHANGE UP (ref 96–108)
CO2 SERPL-SCNC: 28 MMOL/L — SIGNIFICANT CHANGE UP (ref 22–31)
CREAT SERPL-MCNC: 0.69 MG/DL — SIGNIFICANT CHANGE UP (ref 0.5–1.3)
EGFR: 90 ML/MIN/1.73M2 — SIGNIFICANT CHANGE UP
EOSINOPHIL # BLD AUTO: 0.05 K/UL — SIGNIFICANT CHANGE UP (ref 0–0.5)
EOSINOPHIL NFR BLD AUTO: 1.1 % — SIGNIFICANT CHANGE UP (ref 0–6)
GLUCOSE BLDC GLUCOMTR-MCNC: 179 MG/DL — HIGH (ref 70–99)
GLUCOSE BLDC GLUCOMTR-MCNC: 229 MG/DL — HIGH (ref 70–99)
GLUCOSE SERPL-MCNC: 192 MG/DL — HIGH (ref 70–99)
HCT VFR BLD CALC: 41.9 % — SIGNIFICANT CHANGE UP (ref 34.5–45)
HGB BLD-MCNC: 13.2 G/DL — SIGNIFICANT CHANGE UP (ref 11.5–15.5)
IMM GRANULOCYTES NFR BLD AUTO: 0.2 % — SIGNIFICANT CHANGE UP (ref 0–1.5)
LYMPHOCYTES # BLD AUTO: 1.39 K/UL — SIGNIFICANT CHANGE UP (ref 1–3.3)
LYMPHOCYTES # BLD AUTO: 29.5 % — SIGNIFICANT CHANGE UP (ref 13–44)
MAGNESIUM SERPL-MCNC: 2.2 MG/DL — SIGNIFICANT CHANGE UP (ref 1.6–2.6)
MCHC RBC-ENTMCNC: 27.3 PG — SIGNIFICANT CHANGE UP (ref 27–34)
MCHC RBC-ENTMCNC: 31.5 GM/DL — LOW (ref 32–36)
MCV RBC AUTO: 86.7 FL — SIGNIFICANT CHANGE UP (ref 80–100)
MONOCYTES # BLD AUTO: 0.27 K/UL — SIGNIFICANT CHANGE UP (ref 0–0.9)
MONOCYTES NFR BLD AUTO: 5.7 % — SIGNIFICANT CHANGE UP (ref 2–14)
NEUTROPHILS # BLD AUTO: 2.95 K/UL — SIGNIFICANT CHANGE UP (ref 1.8–7.4)
NEUTROPHILS NFR BLD AUTO: 62.7 % — SIGNIFICANT CHANGE UP (ref 43–77)
NRBC # BLD: 0 /100 WBCS — SIGNIFICANT CHANGE UP (ref 0–0)
PLATELET # BLD AUTO: 217 K/UL — SIGNIFICANT CHANGE UP (ref 150–400)
POTASSIUM SERPL-MCNC: 4.3 MMOL/L — SIGNIFICANT CHANGE UP (ref 3.5–5.3)
POTASSIUM SERPL-SCNC: 4.3 MMOL/L — SIGNIFICANT CHANGE UP (ref 3.5–5.3)
RBC # BLD: 4.83 M/UL — SIGNIFICANT CHANGE UP (ref 3.8–5.2)
RBC # FLD: 13 % — SIGNIFICANT CHANGE UP (ref 10.3–14.5)
SODIUM SERPL-SCNC: 138 MMOL/L — SIGNIFICANT CHANGE UP (ref 135–145)
WBC # BLD: 4.71 K/UL — SIGNIFICANT CHANGE UP (ref 3.8–10.5)
WBC # FLD AUTO: 4.71 K/UL — SIGNIFICANT CHANGE UP (ref 3.8–10.5)

## 2022-09-03 PROCEDURE — 99239 HOSP IP/OBS DSCHRG MGMT >30: CPT

## 2022-09-03 RX ORDER — CLOPIDOGREL BISULFATE 75 MG/1
1 TABLET, FILM COATED ORAL
Qty: 30 | Refills: 11
Start: 2022-09-03 | End: 2023-08-28

## 2022-09-03 RX ORDER — ASPIRIN/CALCIUM CARB/MAGNESIUM 324 MG
1 TABLET ORAL
Qty: 30 | Refills: 11
Start: 2022-09-03 | End: 2023-08-28

## 2022-09-03 RX ADMIN — Medication 2: at 06:55

## 2022-09-03 RX ADMIN — Medication 81 MILLIGRAM(S): at 11:17

## 2022-09-03 RX ADMIN — MAGNESIUM OXIDE 400 MG ORAL TABLET 400 MILLIGRAM(S): 241.3 TABLET ORAL at 11:19

## 2022-09-03 RX ADMIN — Medication 5 MILLIGRAM(S): at 11:18

## 2022-09-03 RX ADMIN — LORATADINE 10 MILLIGRAM(S): 10 TABLET ORAL at 11:17

## 2022-09-03 RX ADMIN — ISOSORBIDE MONONITRATE 60 MILLIGRAM(S): 60 TABLET, EXTENDED RELEASE ORAL at 11:17

## 2022-09-03 RX ADMIN — GABAPENTIN 600 MILLIGRAM(S): 400 CAPSULE ORAL at 06:09

## 2022-09-03 RX ADMIN — GABAPENTIN 600 MILLIGRAM(S): 400 CAPSULE ORAL at 01:06

## 2022-09-03 RX ADMIN — CLOPIDOGREL BISULFATE 75 MILLIGRAM(S): 75 TABLET, FILM COATED ORAL at 11:17

## 2022-09-03 RX ADMIN — LOSARTAN POTASSIUM 100 MILLIGRAM(S): 100 TABLET, FILM COATED ORAL at 06:09

## 2022-09-03 RX ADMIN — PANTOPRAZOLE SODIUM 40 MILLIGRAM(S): 20 TABLET, DELAYED RELEASE ORAL at 06:09

## 2022-09-03 NOTE — DISCHARGE NOTE PROVIDER - NSDCFUADDINST_GEN_ALL_CORE_FT
- Do NOT drive or operate hazardous machinery for 24 hours. Limit your physical activity for 24-48 hours. Do NOT engage in sports, heavy work or heavy lifting more than 5 lbs for 5 days.   - You MAY shower and wash the area gently with soap and water. BUT no TUB BATHS, HOT TUBS OR SWIMMING FOR 5 DAYS.  Do not keep the area covered. Do not put any lotions, creams, or ointments on the site.  - Your procedure was done through your right wrist. If you observe flank bleeding from the puncture site, it is an emergency. Please put direct pressure on the site and go directly to the ER. Bleeding under the skin may also occur and a small "black and blue" may be expected. If the area appears to be expanding or swelling around the puncture site, apply manual compression and go immediately to the nearest ER. If your arm/hand becomes cool or blue and/or you are unable to move it, this must be treated as an emergency, go directly to the nearest ER. Look for signs of infection in the wrist: fever, red streaking of the arm, obvious pus formation and pain.  -If you have any issues or concerns regarding your access site, you may call [facility] Interventional Cardiology at [phone]. - Do NOT drive or operate hazardous machinery for 24 hours. Limit your physical activity for 24-48 hours. Do NOT engage in sports, heavy work or heavy lifting more than 5 lbs for 5 days.   - You MAY shower and wash the area gently with soap and water. BUT no TUB BATHS, HOT TUBS OR SWIMMING FOR 5 DAYS.  Do not keep the area covered. Do not put any lotions, creams, or ointments on the site.  - Your procedure was done through your right wrist. If you observe flank bleeding from the puncture site, it is an emergency. Please put direct pressure on the site and go directly to the ER. Bleeding under the skin may also occur and a small "black and blue" may be expected. If the area appears to be expanding or swelling around the puncture site, apply manual compression and go immediately to the nearest ER. If your arm/hand becomes cool or blue and/or you are unable to move it, this must be treated as an emergency, go directly to the nearest ER. Look for signs of infection in the wrist: fever, red streaking of the arm, obvious pus formation and pain.  -If you have any issues or concerns regarding your access site, you may call [facility] Interventional Cardiology at [phone]. - Do NOT drive or operate hazardous machinery for 24 hours. Limit your physical activity for 24-48 hours. Do NOT engage in sports, heavy work or heavy lifting more than 5 lbs for 5 days.   - You MAY shower and wash the area gently with soap and water. BUT no TUB BATHS, HOT TUBS OR SWIMMING FOR 5 DAYS.  Do not keep the area covered. Do not put any lotions, creams, or ointments on the site.  - Your procedure was done through your right wrist. If you observe flank bleeding from the puncture site, it is an emergency. Please put direct pressure on the site and go directly to the ER. Bleeding under the skin may also occur and a small "black and blue" may be expected. If the area appears to be expanding or swelling around the puncture site, apply manual compression and go immediately to the nearest ER. If your arm/hand becomes cool or blue and/or you are unable to move it, this must be treated as an emergency, go directly to the nearest ER. Look for signs of infection in the wrist: fever, red streaking of the arm, obvious pus formation and pain.  -If you have any issues or concerns regarding your access site, you may call [facility] Interventional Cardiology at [phone]. - Limit your physical activity for 24-48 hours. Do NOT engage in sports, heavy work or heavy lifting more than 5 lbs for 5 days.   - You MAY shower and wash the area gently with soap and water. BUT no TUB BATHS, HOT TUBS OR SWIMMING FOR 1 week.  Do not keep the area covered. Do not put any lotions, creams, or ointments on the site.  - Your procedure was done through your right wrist. If you observe flank bleeding from the puncture site, it is an emergency. Please put direct pressure on the site and go directly to the ER. Bleeding under the skin may also occur and a small "black and blue" may be expected. If the area appears to be expanding or swelling around the puncture site, apply manual compression and go immediately to the nearest ER. If your arm/hand becomes cool or blue and/or you are unable to move it, this must be treated as an emergency, go directly to the nearest ER. Look for signs of infection in the wrist: fever, red streaking of the arm, obvious pus formation and pain.  -If you have any issues or concerns regarding your access site, you may call [facility] Interventional Cardiology at [phone]. - Limit your physical activity for 24-48 hours. Do NOT engage in sports, heavy work or heavy lifting more than 5 lbs for 5 days.   - You MAY shower and wash the area gently with soap and water. BUT no TUB BATHS, HOT TUBS OR SWIMMING FOR 1 week.  Do not keep the area covered. Do not put any lotions, creams, or ointments on the site.  - Your procedure was done through your right wrist. If you observe flank bleeding from the puncture site, it is an emergency. Please put direct pressure on the site and go directly to the ER. Bleeding under the skin may also occur and a small "black and blue" may be expected. If the area appears to be expanding or swelling around the puncture site, apply manual compression and go immediately to the nearest ER. If your arm/hand becomes cool or blue and/or you are unable to move it, this must be treated as an emergency, go directly to the nearest ER. Look for signs of infection in the wrist: fever, red streaking of the arm, obvious pus formation and pain.  -If you have any issues or concerns regarding your access site, you may call [facility] Interventional Cardiology at [phone]. - Limit your physical activity for 24-48 hours. Do NOT engage in sports, heavy work or heavy lifting more than 5 lbs for 5 days.   - You MAY shower and wash the area gently with soap and water. BUT no TUB BATHS, HOT TUBS OR SWIMMING FOR 1 week.  Do not keep the area covered. Do not put any lotions, creams, or ointments on the site.  - Your procedure was done through your right wrist. If you observe flank bleeding from the puncture site, it is an emergency. Please put direct pressure on the site and go directly to the ER. Bleeding under the skin may also occur and a small "black and blue" may be expected. If the area appears to be expanding or swelling around the puncture site, apply manual compression and go immediately to the nearest ER. If your arm/hand becomes cool or blue and/or you are unable to move it, this must be treated as an emergency, go directly to the nearest ER. Look for signs of infection in the wrist: fever, red streaking of the arm, obvious pus formation and pain.  -If you have any issues or concerns regarding your access site, you may call [facility] Interventional Cardiology at [phone].

## 2022-09-03 NOTE — DISCHARGE NOTE PROVIDER - HOSPITAL COURSE
76 y/o Kyrgyz speaking female, POOR HISTORIAN, former smoker, w/ PMHx HTN, HLD, Typee II DM, CAD (s/p prior PCI LAD, LCX, and staged PCI RCA), Asthma, and colon cancer (s/p chemo and radiation, 16 years ago), who presented to outpatient cardiologist, Dr. Troy, endorsing worsening chest pain and SOB, symptoms relieved with rest. Patient had an initial cardiac catheterization on 10/01/2021 w/ CSI/MEG ostial LAD, CSI/MEG mid LAD, and MEG x 2 OM2; subsequently returned on 10/29/2021 for staged intervention w/ MEG proximal RCA, MEG mid RCA, MEG distal RCA.  Patient currently endorses symptoms are similar to when she previously required stents. CCTA (08/22/2022) revealed up to moderate stenosis within the proximal LCx and mild stenosis within the LAD, first and secon diagonal branches and RCA; patent LAD, RCA, and LCx/OM2 stents (note the LAD stent extends into the distal LM and covers the ostium LCx), and mild bibasilar peripheral scarring. Echocardiogram (08/10/2022) showed normal LV size w/ normal global wall motion and mild AR. In light of patient's risk factors, CCS Class III anginal symptoms, and abnormal CCTA results, patient presents for cardiac catheterization. Pt underwent cardiac cath 9/2/2022 s/p MEG pLCx kissing balloon pLAD, EF normal. R radial access site CDI w/o hematoma. Pt is to continue ASA/Plavix, statin.     On the day of discharge, pt was seen and examined at bedside, denies any complaints of chest pain, dizziness, SOB, palpitations, pain, LE edema, fever and/or chills. Right radial access site soft, no bleeding or swelling at site, radial pulse 2+. No events on tele overnight, VSS, Labs unremarkable/stable, Physical exam WNL. Pt is to follow up with cardiologist Dr. Troy in 1-2 weeks for post discharge check-up. Pt instructed to return to ED/seek immediate medical attention if symptoms of chest pain, SOB, LOC, bleeding from the access site. Pt agrees with the discharge plan, verbalizes understanding of the information given. All new medications explained risks/side effects and e-prescribed to patient preferred pharmacy. Referred for Cardiac Rehab (CAD Post PCI): Education on benefits of Cardiac Rehab provided to patient. Referral and Prescription Given for Cardiac Rehab. Pt given list of locations & instructed to contact their insurance company to review list of participating providers. Pt instructed to bring Cardiac Rehab prescription with them to Cardiology Follow up appointment for assistance with enrollment. 76 y/o Macedonian speaking female, POOR HISTORIAN, former smoker, w/ PMHx HTN, HLD, Typee II DM, CAD (s/p prior PCI LAD, LCX, and staged PCI RCA), Asthma, and colon cancer (s/p chemo and radiation, 16 years ago), who presented to outpatient cardiologist, Dr. Troy, endorsing worsening chest pain and SOB, symptoms relieved with rest. Patient had an initial cardiac catheterization on 10/01/2021 w/ CSI/MEG ostial LAD, CSI/MEG mid LAD, and MEG x 2 OM2; subsequently returned on 10/29/2021 for staged intervention w/ MEG proximal RCA, MEG mid RCA, MEG distal RCA.  Patient currently endorses symptoms are similar to when she previously required stents. CCTA (08/22/2022) revealed up to moderate stenosis within the proximal LCx and mild stenosis within the LAD, first and secon diagonal branches and RCA; patent LAD, RCA, and LCx/OM2 stents (note the LAD stent extends into the distal LM and covers the ostium LCx), and mild bibasilar peripheral scarring. Echocardiogram (08/10/2022) showed normal LV size w/ normal global wall motion and mild AR. In light of patient's risk factors, CCS Class III anginal symptoms, and abnormal CCTA results, patient presents for cardiac catheterization. Pt underwent cardiac cath 9/2/2022 s/p MEG pLCx kissing balloon pLAD, EF normal. R radial access site CDI w/o hematoma. Pt is to continue ASA/Plavix, statin.     On the day of discharge, pt was seen and examined at bedside, denies any complaints of chest pain, dizziness, SOB, palpitations, pain, LE edema, fever and/or chills. Right radial access site soft, no bleeding or swelling at site, radial pulse 2+. No events on tele overnight, VSS, Labs unremarkable/stable, Physical exam WNL. Pt is to follow up with cardiologist Dr. Troy in 1-2 weeks for post discharge check-up. Pt instructed to return to ED/seek immediate medical attention if symptoms of chest pain, SOB, LOC, bleeding from the access site. Pt agrees with the discharge plan, verbalizes understanding of the information given. All new medications explained risks/side effects and e-prescribed to patient preferred pharmacy. Referred for Cardiac Rehab (CAD Post PCI): Education on benefits of Cardiac Rehab provided to patient. Referral and Prescription Given for Cardiac Rehab. Pt given list of locations & instructed to contact their insurance company to review list of participating providers. Pt instructed to bring Cardiac Rehab prescription with them to Cardiology Follow up appointment for assistance with enrollment. 78 y/o Ukrainian speaking female, POOR HISTORIAN, former smoker, w/ PMHx HTN, HLD, Typee II DM, CAD (s/p prior PCI LAD, LCX, and staged PCI RCA), Asthma, and colon cancer (s/p chemo and radiation, 16 years ago), who presented to outpatient cardiologist, Dr. Troy, endorsing worsening chest pain and SOB, symptoms relieved with rest. Patient had an initial cardiac catheterization on 10/01/2021 w/ CSI/MEG ostial LAD, CSI/MEG mid LAD, and MEG x 2 OM2; subsequently returned on 10/29/2021 for staged intervention w/ MEG proximal RCA, MEG mid RCA, MEG distal RCA.  Patient currently endorses symptoms are similar to when she previously required stents. CCTA (08/22/2022) revealed up to moderate stenosis within the proximal LCx and mild stenosis within the LAD, first and secon diagonal branches and RCA; patent LAD, RCA, and LCx/OM2 stents (note the LAD stent extends into the distal LM and covers the ostium LCx), and mild bibasilar peripheral scarring. Echocardiogram (08/10/2022) showed normal LV size w/ normal global wall motion and mild AR. In light of patient's risk factors, CCS Class III anginal symptoms, and abnormal CCTA results, patient presents for cardiac catheterization. Pt underwent cardiac cath 9/2/2022 s/p MEG pLCx kissing balloon pLAD, EF normal. R radial access site CDI w/o hematoma. Pt is to continue ASA/Plavix, statin.     On the day of discharge, pt was seen and examined at bedside, denies any complaints of chest pain, dizziness, SOB, palpitations, pain, LE edema, fever and/or chills. Right radial access site soft, no bleeding or swelling at site, radial pulse 2+. No events on tele overnight, VSS, Labs unremarkable/stable, Physical exam WNL. Pt is to follow up with cardiologist Dr. Troy in 1-2 weeks for post discharge check-up. Pt instructed to return to ED/seek immediate medical attention if symptoms of chest pain, SOB, LOC, bleeding from the access site. Pt agrees with the discharge plan, verbalizes understanding of the information given. All new medications explained risks/side effects and e-prescribed to patient preferred pharmacy. Referred for Cardiac Rehab (CAD Post PCI): Education on benefits of Cardiac Rehab provided to patient. Referral and Prescription Given for Cardiac Rehab. Pt given list of locations & instructed to contact their insurance company to review list of participating providers. Pt instructed to bring Cardiac Rehab prescription with them to Cardiology Follow up appointment for assistance with enrollment. 78 y/o Kiswahili speaking female, POOR HISTORIAN, former smoker, w/ PMHx HTN, HLD, Typee II DM, CAD (s/p prior PCI LAD, LCX, and staged PCI RCA), Asthma, and colon cancer (s/p chemo and radiation, 16 years ago), who presented to outpatient cardiologist, Dr. Troy, endorsing worsening chest pain and SOB, symptoms relieved with rest. Patient had an initial cardiac catheterization on 10/01/2021 w/ CSI/MEG ostial LAD, CSI/MEG mid LAD, and MEG x 2 OM2; subsequently returned on 10/29/2021 for staged intervention w/ MEG proximal RCA, MEG mid RCA, MEG distal RCA.  Patient currently endorses symptoms are similar to when she previously required stents. CCTA (08/22/2022) revealed up to moderate stenosis within the proximal LCx and mild stenosis within the LAD, first and secon diagonal branches and RCA; patent LAD, RCA, and LCx/OM2 stents (note the LAD stent extends into the distal LM and covers the ostium LCx), and mild bibasilar peripheral scarring. Echocardiogram (08/10/2022) showed normal LV size w/ normal global wall motion and mild AR. In light of patient's risk factors, CCS Class III anginal symptoms, and abnormal CCTA results, patient presents for cardiac catheterization. Pt underwent cardiac cath 9/2/2022 s/p MEG pLCx kissing balloon pLAD, EF normal. R radial access site CDI w/o hematoma. Pt is to continue ASA/Plavix, statin, Vascepa. LDL 87, consider switched to atorvastatin as outpt w/ Dr Troy. Discussed elevated HgA1c 9.8 w/ pt, reports occasionally eating sweets and she will follow up w/ PMD for possible DM regimen adjustments.    On the day of discharge, pt was seen and examined at bedside, denies any complaints of chest pain, dizziness, SOB, palpitations, pain, LE edema, fever and/or chills. Right radial access site soft, no bleeding or swelling at site, radial pulse 2+. No events on tele overnight, VSS, Labs unremarkable/stable, Physical exam WNL. Pt is to follow up with cardiologist Dr. Troy in 1-2 weeks for post discharge check-up. Pt instructed to return to ED/seek immediate medical attention if symptoms of chest pain, SOB, LOC, bleeding from the access site. Pt agrees with the discharge plan, verbalizes understanding of the information given. All new medications explained risks/side effects and e-prescribed to patient preferred pharmacy. Referred for Cardiac Rehab (CAD Post PCI): Education on benefits of Cardiac Rehab provided to patient. Referral and Prescription Given for Cardiac Rehab. Pt given list of locations & instructed to contact their insurance company to review list of participating providers. Pt instructed to bring Cardiac Rehab prescription with them to Cardiology Follow up appointment for assistance with enrollment. 78 y/o Vietnamese speaking female, POOR HISTORIAN, former smoker, w/ PMHx HTN, HLD, Typee II DM, CAD (s/p prior PCI LAD, LCX, and staged PCI RCA), Asthma, and colon cancer (s/p chemo and radiation, 16 years ago), who presented to outpatient cardiologist, Dr. Troy, endorsing worsening chest pain and SOB, symptoms relieved with rest. Patient had an initial cardiac catheterization on 10/01/2021 w/ CSI/MEG ostial LAD, CSI/MEG mid LAD, and MEG x 2 OM2; subsequently returned on 10/29/2021 for staged intervention w/ MEG proximal RCA, MEG mid RCA, MEG distal RCA.  Patient currently endorses symptoms are similar to when she previously required stents. CCTA (08/22/2022) revealed up to moderate stenosis within the proximal LCx and mild stenosis within the LAD, first and secon diagonal branches and RCA; patent LAD, RCA, and LCx/OM2 stents (note the LAD stent extends into the distal LM and covers the ostium LCx), and mild bibasilar peripheral scarring. Echocardiogram (08/10/2022) showed normal LV size w/ normal global wall motion and mild AR. In light of patient's risk factors, CCS Class III anginal symptoms, and abnormal CCTA results, patient presents for cardiac catheterization. Pt underwent cardiac cath 9/2/2022 s/p MEG pLCx kissing balloon pLAD, EF normal. R radial access site CDI w/o hematoma. Pt is to continue ASA/Plavix, statin, Vascepa. LDL 87, consider switched to atorvastatin as outpt w/ Dr Troy. Discussed elevated HgA1c 9.8 w/ pt, reports occasionally eating sweets and she will follow up w/ PMD for possible DM regimen adjustments.    On the day of discharge, pt was seen and examined at bedside, denies any complaints of chest pain, dizziness, SOB, palpitations, pain, LE edema, fever and/or chills. Right radial access site soft, no bleeding or swelling at site, radial pulse 2+. No events on tele overnight, VSS, Labs unremarkable/stable, Physical exam WNL. Pt is to follow up with cardiologist Dr. Troy in 1-2 weeks for post discharge check-up. Pt instructed to return to ED/seek immediate medical attention if symptoms of chest pain, SOB, LOC, bleeding from the access site. Pt agrees with the discharge plan, verbalizes understanding of the information given. All new medications explained risks/side effects and e-prescribed to patient preferred pharmacy. Referred for Cardiac Rehab (CAD Post PCI): Education on benefits of Cardiac Rehab provided to patient. Referral and Prescription Given for Cardiac Rehab. Pt given list of locations & instructed to contact their insurance company to review list of participating providers. Pt instructed to bring Cardiac Rehab prescription with them to Cardiology Follow up appointment for assistance with enrollment. 76 y/o Lithuanian speaking female, POOR HISTORIAN, former smoker, w/ PMHx HTN, HLD, Typee II DM, CAD (s/p prior PCI LAD, LCX, and staged PCI RCA), Asthma, and colon cancer (s/p chemo and radiation, 16 years ago), who presented to outpatient cardiologist, Dr. Troy, endorsing worsening chest pain and SOB, symptoms relieved with rest. Patient had an initial cardiac catheterization on 10/01/2021 w/ CSI/MEG ostial LAD, CSI/MEG mid LAD, and MEG x 2 OM2; subsequently returned on 10/29/2021 for staged intervention w/ MEG proximal RCA, MEG mid RCA, MEG distal RCA.  Patient currently endorses symptoms are similar to when she previously required stents. CCTA (08/22/2022) revealed up to moderate stenosis within the proximal LCx and mild stenosis within the LAD, first and secon diagonal branches and RCA; patent LAD, RCA, and LCx/OM2 stents (note the LAD stent extends into the distal LM and covers the ostium LCx), and mild bibasilar peripheral scarring. Echocardiogram (08/10/2022) showed normal LV size w/ normal global wall motion and mild AR. In light of patient's risk factors, CCS Class III anginal symptoms, and abnormal CCTA results, patient presents for cardiac catheterization. Pt underwent cardiac cath 9/2/2022 s/p MEG pLCx kissing balloon pLAD, EF normal. R radial access site CDI w/o hematoma. Pt is to continue ASA/Plavix, statin, Vascepa. LDL 87, consider switched to atorvastatin as outpt w/ Dr Troy. Discussed elevated HgA1c 9.8 w/ pt, reports occasionally eating sweets and she will follow up w/ PMD for possible DM regimen adjustments.    On the day of discharge, pt was seen and examined at bedside, denies any complaints of chest pain, dizziness, SOB, palpitations, pain, LE edema, fever and/or chills. Right radial access site soft, no bleeding or swelling at site, radial pulse 2+. No events on tele overnight, VSS, Labs unremarkable/stable, Physical exam WNL. Pt is to follow up with cardiologist Dr. Troy in 1-2 weeks for post discharge check-up. Pt instructed to return to ED/seek immediate medical attention if symptoms of chest pain, SOB, LOC, bleeding from the access site. Pt agrees with the discharge plan, verbalizes understanding of the information given. All new medications explained risks/side effects and e-prescribed to patient preferred pharmacy. Referred for Cardiac Rehab (CAD Post PCI): Education on benefits of Cardiac Rehab provided to patient. Referral and Prescription Given for Cardiac Rehab. Pt given list of locations & instructed to contact their insurance company to review list of participating providers. Pt instructed to bring Cardiac Rehab prescription with them to Cardiology Follow up appointment for assistance with enrollment.

## 2022-09-03 NOTE — DISCHARGE NOTE PROVIDER - CARE PROVIDER_API CALL
Ritchie Troy  [address] [place] [address], [address] [place] [address]  [place] [address]  Phone: [phone]  Fax: (   )    -  Established Patient  Follow Up Time: 2 weeks   Ritchie Troy  [address] [place] [address], [address] [place] [address]  [place] [address]  Phone: [phone]  Fax: (   )    -  Established Patient  Follow Up Time: 2 weeks   Ritchie Troy  [address] [place] [address], [address] [place] [address]  [place] [address]  Phone: [phone]  Fax: (   )    -  Established Patient  Follow Up Time: 2 weeks

## 2022-09-03 NOTE — DISCHARGE NOTE NURSING/CASE MANAGEMENT/SOCIAL WORK - BRAND OF SECOND COVID-19 BOOSTER
Tequila John[facility] urine tests were negative. Let me know if you have questions.  Sincerely,  Magali Munroe [employer]

## 2022-09-03 NOTE — DISCHARGE NOTE NURSING/CASE MANAGEMENT/SOCIAL WORK - NSDCPEFALRISK_GEN_ALL_CORE
For information on Fall & Injury Prevention, visit: [email or web address]-[email or web address]-health-[email or web address]-mobility OR  [email or web address]-tips-[email or web address]-avoid-injury OR  https://www.cdc.gov/steadi/patient.html For information on Fall & Injury Prevention, visit: [email or web address]://www.[email or web address]/news/fall-[email or web address]-and-maintains-health-[email or web address]-mobility OR  [email or web address]://www.[facility]/news/fall-prevention-tips-to-avoid-injury OR  https://www.cdc.gov/steadi/patient.html For information on Fall & Injury Prevention, visit: https://www.[facility]/news/fall-prevention-protects-and-maintains-health-and-mobility OR  https://www.[facility]/news/fall-prevention-tips-to-avoid-injury OR  https://www.cdc.gov/steadi/patient.html

## 2022-09-03 NOTE — DISCHARGE NOTE NURSING/CASE MANAGEMENT/SOCIAL WORK - PATIENT PORTAL LINK FT
You can access the FollowMyHealth Patient Portal offered by [facility] by registering at the following website: [email or web address]. By joining [employer]’s FollowMyHealth portal, you will also be able to view your health information using other applications (apps) compatible with our system. You can access the FollowMyHealth Patient Portal offered by [facility] by registering at the following website: [email or web address]. By joining [employer]’s FollowMyHealth portal, you will also be able to view your health information using other applications (apps) compatible with our system. You can access the FollowMyHealth Patient Portal offered by [facility] by registering at the following website: [email or web address]. By joining [employer]’s FollowMyHealth portal, you will also be able to view your health information using other applications (apps) compatible with our system.

## 2022-09-03 NOTE — DISCHARGE NOTE PROVIDER - NSDCCPCAREPLAN_GEN_ALL_CORE_FT
PRINCIPAL DISCHARGE DIAGNOSIS  Diagnosis: CAD S/P percutaneous coronary angioplasty  Assessment and Plan of Treatment:       SECONDARY DISCHARGE DIAGNOSES  Diagnosis: DM (diabetes mellitus)  Assessment and Plan of Treatment: You received contrast during the cardiac catheterization procedure which can cause kidney abnormality when combined with with your diabetic medication Metformin. Please HOLD Metformin for 2 days after the procedure, you may RESUME on 9/5/22. Take your other diabetes medications.     PRINCIPAL DISCHARGE DIAGNOSIS  Diagnosis: CAD S/P percutaneous coronary angioplasty  Assessment and Plan of Treatment: You had an abnormal CT Scan of the Heart and you came in for a cardiac catheterization where a drug-eluting cardiac stent was placed to blockage in the Left Circumflex Artery. You will need to continue taking medications Asprin and Plavix daily for the cardiac stent. DO NOT STOP taking these medications unless directed by your cardiologist as this can be LIFE THREATENING and lead to closing up of the cardiac stent and lead to a heart attack!      SECONDARY DISCHARGE DIAGNOSES  Diagnosis: DM (diabetes mellitus)  Assessment and Plan of Treatment: Your HgA1c level was found to be high at 9.8, where the goal should be 7. Avoid food with sugar. Follow up with your primary care physician for possible adjustments to your diabetes medications. You received contrast during the cardiac catheterization procedure which can cause kidney abnormality when combined with with your diabetic medication Metformin. Please HOLD Metformin for 2 days after the procedure, you may RESUME on 9/5/22. Take your other diabetes medications.    Diagnosis: HLD (hyperlipidemia)  Assessment and Plan of Treatment: You were found to have elevated LDL level of 87, which is should be less than 70 in patients with heart disease. Discuss with your cardiologist Dr Troy for possible adjustment to your cholesterol medications Simvastatin and Vascepa.

## 2022-09-03 NOTE — DISCHARGE NOTE PROVIDER - NSDCMRMEDTOKEN_GEN_ALL_CORE_FT
Aspirin Enteric Coated 81 mg oral delayed release tablet: 1 tab(s) orally once a day  Calcium 500+D oral tablet, chewable: 1 tab(s) orally daily  Cardiac rehabilitation. 3 times a week for 12 weeks. Dx CAD s/p PCI/ Outpatient cardiologist Dr Ritchie Troy [phone]:   Claritin 10 mg oral tablet: 1 tab(s) orally once a day  Creon 24,000 units oral delayed release capsule: 1 cap(s) orally 2 times a day  Dexilant 60 mg oral delayed release capsule: 1 cap(s) orally once a day  gabapentin 600 mg oral tablet: 1 tab(s) orally daily  glimepiride 4 mg oral tablet: 1 tab(s) orally once a day  Imdur 60 mg oral tablet, extended release: 1 tab(s) orally once a day (in the morning)  Levemir 100 units/mL subcutaneous solution: 40 unit(s) subcutaneous once a day (at bedtime)  lidocaine 4% patch: 1 patch every 12 hours  losartan-hydrochlorothiazide 100 mg-12.5 mg oral tablet: 1 tab(s) orally once a day  magnesium oxide 400 mg oral tablet: 1 tab(s) orally once a day  metFORMIN 1000 mg oral tablet: 1 tab(s) orally 2 times a day. HOLD and RESUME on 9/5/22.  Mobic 15 mg oral tablet: 1 tab(s) orally once a day  nitroglycerin 0.4 mg sublingual tablet: 1 tab(s) sublingual every 5 minutes  NovoLIN 70/30 FlexPen subcutaneous suspension: 40 units twice daily (Breakfast and dinner)  oxybutynin 5 mg oral tablet: 1 tab(s) orally daily  Plavix 75 mg oral tablet: 1 tab(s) orally once a day  Prolia 60 mg/mL subcutaneous solution: every 6 months  Senna 8.6 mg oral tablet: 1 tab(s) orally once a day (at bedtime)  Tylenol 325 mg oral capsule: 2 cap(s) orally 2 times a day  Vascepa 1 g oral capsule: 2 cap(s) orally 2 times a day  Zocor 20 mg oral tablet: 1 tab(s) orally once a day (at bedtime)   Aspirin Enteric Coated 81 mg oral delayed release tablet: 1 tab(s) orally once a day  Calcium 500+D oral tablet, chewable: 1 tab(s) orally daily  Cardiac rehabilitation. 3 times a week for 12 weeks. Dx CAD s/p PCI/ Outpatient cardiologist Dr Ritchie Troy [phone]:   Claritin 10 mg oral tablet: 1 tab(s) orally once a day  Creon 24,000 units oral delayed release capsule: 1 cap(s) orally 2 times a day  Dexilant 60 mg oral delayed release capsule: 1 cap(s) orally once a day  gabapentin 600 mg oral tablet: 1 tab(s) orally daily  glimepiride 4 mg oral tablet: 1 tab(s) orally once a day  Imdur 60 mg oral tablet, extended release: 1 tab(s) orally once a day (in the morning)  Levemir 100 units/mL subcutaneous solution: 40 unit(s) subcutaneous once a day (at bedtime)  lidocaine 4% patch: 1 patch every 12 hours  losartan-hydrochlorothiazide 100 mg-12.5 mg oral tablet: 1 tab(s) orally once a day  magnesium oxide 400 mg oral tablet: 1 tab(s) orally once a day  metFORMIN 1000 mg oral tablet: 1 tab(s) orally 2 times a day. HOLD and RESUME on 9/5/22.  Mobic 15 mg oral tablet: 1 tab(s) orally once a day  nitroglycerin 0.4 mg sublingual tablet: 1 tab(s) sublingual every 5 minutes  NovoLIN 70/30 FlexPen subcutaneous suspension: 40 units twice daily (Breakfast and dinner)  oxybutynin 5 mg oral tablet: 1 tab(s) orally daily  Plavix 75 mg oral tablet: 1 tab(s) orally once a day  Prolia 60 mg/mL subcutaneous solution: every 6 months  Senna 8.6 mg oral tablet: 1 tab(s) orally once a day (at bedtime)  Tylenol 325 mg oral capsule: 2 cap(s) orally 2 times a day  Vascepa 1 g oral capsule: 2 cap(s) orally 2 times a day  Zocor 20 mg oral tablet: 1 tab(s) orally once a day (at bedtime)   Aspirin Enteric Coated 81 mg oral delayed release tablet: 1 tab(s) orally once a day  Calcium 500+D oral tablet, chewable: 1 tab(s) orally daily  Cardiac rehabilitation. 3 times a week for 12 weeks. Dx CAD s/p PCI/ Outpatient cardiologist Dr Ritchie Troy [phone]:   Claritin 10 mg oral tablet: 1 tab(s) orally once a day  Creon 24,000 units oral delayed release capsule: 1 cap(s) orally 2 times a day  Dexilant 60 mg oral delayed release capsule: 1 cap(s) orally once a day  gabapentin 600 mg oral tablet: 1 tab(s) orally daily  glimepiride 4 mg oral tablet: 1 tab(s) orally once a day  Imdur 60 mg oral tablet, extended release: 1 tab(s) orally once a day (in the morning)  Levemir 100 units/mL subcutaneous solution: 40 unit(s) subcutaneous once a day (at bedtime)  lidocaine 4% patch: 1 patch every 12 hours  losartan-hydrochlorothiazide 100 mg-12.5 mg oral tablet: 1 tab(s) orally once a day  magnesium oxide 400 mg oral tablet: 1 tab(s) orally once a day  metFORMIN 1000 mg oral tablet: 1 tab(s) orally 2 times a day. HOLD and RESUME on 9/5/22.  Mobic 15 mg oral tablet: 1 tab(s) orally once a day  nitroglycerin 0.4 mg sublingual tablet: 1 tab(s) sublingual every 5 minutes  NovoLIN 70/30 FlexPen subcutaneous suspension: 40 units twice daily (Breakfast and dinner)  oxybutynin 5 mg oral tablet: 1 tab(s) orally daily  Plavix 75 mg oral tablet: 1 tab(s) orally once a day  Prolia 60 mg/mL subcutaneous solution: every 6 months  Senna 8.6 mg oral tablet: 1 tab(s) orally once a day (at bedtime)  Tylenol 325 mg oral capsule: 2 cap(s) orally 2 times a day  Vascepa 1 g oral capsule: 2 cap(s) orally 2 times a day  Zocor 20 mg oral tablet: 1 tab(s) orally once a day (at bedtime)   Aspirin Enteric Coated 81 mg oral delayed release tablet: 1 tab(s) orally once a day  Calcium 500+D oral tablet, chewable: 1 tab(s) orally daily  Cardiac rehabilitation. 3 times a week for 12 weeks. Dx CAD s/p PCI/ Outpatient cardiologist Dr Ritchie Troy [phone]:   Claritin 10 mg oral tablet: 1 tab(s) orally once a day  Creon 24,000 units oral delayed release capsule: 1 cap(s) orally 2 times a day  Dexilant 60 mg oral delayed release capsule: 1 cap(s) orally once a day  gabapentin 600 mg oral tablet: 1 tab(s) orally daily  glimepiride 4 mg oral tablet: 1 tab(s) orally once a day  Imdur 60 mg oral tablet, extended release: 1 tab(s) orally once a day (in the morning)  Levemir 100 units/mL subcutaneous solution: 40 unit(s) subcutaneous once a day (at bedtime)  lidocaine 4% patch: 1 patch every 12 hours  losartan-hydrochlorothiazide 100 mg-12.5 mg oral tablet: 1 tab(s) orally once a day  magnesium oxide 400 mg oral tablet: 1 tab(s) orally once a day  metFORMIN 1000 mg oral tablet: 1 tab(s) orally 2 times a day. HOLD and RESUME on 9/5/22.  Mobic 15 mg oral tablet: 1 tab(s) orally once a day  nitroglycerin 0.4 mg sublingual tablet: 1 tab(s) sublingual every 5 minutes, As Needed for chest pain  NovoLIN 70/30 FlexPen subcutaneous suspension: 40 units twice daily (Breakfast and dinner)  oxybutynin 5 mg oral tablet: 1 tab(s) orally daily  Plavix 75 mg oral tablet: 1 tab(s) orally once a day  Prolia 60 mg/mL subcutaneous solution: every 6 months  Senna 8.6 mg oral tablet: 1 tab(s) orally once a day (at bedtime)  Tylenol 325 mg oral capsule: 2 cap(s) orally 2 times a day  Vascepa 1 g oral capsule: 2 cap(s) orally 2 times a day  Zocor 20 mg oral tablet: 1 tab(s) orally once a day (at bedtime)   Aspirin Enteric Coated 81 mg oral delayed release tablet: 1 tab(s) orally once a day  Calcium 500+D oral tablet, chewable: 1 tab(s) orally daily  Cardiac rehabilitation. 3 times a week for 12 weeks. Dx CAD s/p PCI/ Outpatient cardiologist Dr Ritchie Troy [phone]:   Claritin 10 mg oral tablet: 1 tab(s) orally once a day  Creon 24,000 units oral delayed release capsule: 1 cap(s) orally 2 times a day  Dexilant 60 mg oral delayed release capsule: 1 cap(s) orally once a day  gabapentin 600 mg oral tablet: 1 tab(s) orally daily  glimepiride 4 mg oral tablet: 1 tab(s) orally once a day  Imdur 60 mg oral tablet, extended release: 1 tab(s) orally once a day (in the morning)  Levemir 100 units/mL subcutaneous solution: 40 unit(s) subcutaneous once a day (at bedtime)  lidocaine 4% patch: 1 patch every 12 hours  losartan-hydrochlorothiazide 100 mg-12.5 mg oral tablet: 1 tab(s) orally once a day  magnesium oxide 400 mg oral tablet: 1 tab(s) orally once a day  metFORMIN 1000 mg oral tablet: 1 tab(s) orally 2 times a day. HOLD and RESUME on 9/5/22.  Mobic 15 mg oral tablet: 1 tab(s) orally once a day  nitroglycerin 0.4 mg sublingual tablet: 1 tab(s) sublingual every 5 minutes, As Needed for chest pain  NovoLIN 70/30 FlexPen subcutaneous suspension: 40 units twice daily (Breakfast and dinner)  oxybutynin 5 mg oral tablet: 1 tab(s) orally daily  Plavix 75 mg oral tablet: 1 tab(s) orally once a day  Prolia 60 mg/mL subcutaneous solution: every 6 months  Senna 8.6 mg oral tablet: 1 tab(s) orally once a day (at bedtime)  Tylenol 325 mg oral capsule: 2 cap(s) orally 2 times a day  Vascepa 1 g oral capsule: 2 cap(s) orally 2 times a day  Zocor 20 mg oral tablet: 1 tab(s) orally once a day (at bedtime)   Aspirin Enteric Coated 81 mg oral delayed release tablet: 1 tab(s) orally once a day  Calcium 500+D oral tablet, chewable: 1 tab(s) orally daily  Cardiac rehabilitation. 3 times a week for 12 weeks. Dx CAD s/p PCI/ Outpatient cardiologist Dr Ritchie Troy [phone]:   Claritin 10 mg oral tablet: 1 tab(s) orally once a day  Creon 24,000 units oral delayed release capsule: 1 cap(s) orally 2 times a day  Dexilant 60 mg oral delayed release capsule: 1 cap(s) orally once a day  gabapentin 600 mg oral tablet: 1 tab(s) orally daily  glimepiride 4 mg oral tablet: 1 tab(s) orally once a day  Imdur 60 mg oral tablet, extended release: 1 tab(s) orally once a day (in the morning)  Levemir 100 units/mL subcutaneous solution: 40 unit(s) subcutaneous once a day (at bedtime)  lidocaine 4% patch: 1 patch every 12 hours  losartan-hydrochlorothiazide 100 mg-12.5 mg oral tablet: 1 tab(s) orally once a day  magnesium oxide 400 mg oral tablet: 1 tab(s) orally once a day  metFORMIN 1000 mg oral tablet: 1 tab(s) orally 2 times a day. HOLD and RESUME on 9/5/22.  Mobic 15 mg oral tablet: 1 tab(s) orally once a day  nitroglycerin 0.4 mg sublingual tablet: 1 tab(s) sublingual every 5 minutes, As Needed for chest pain  NovoLIN 70/30 FlexPen subcutaneous suspension: 40 units twice daily (Breakfast and dinner)  oxybutynin 5 mg oral tablet: 1 tab(s) orally daily  Plavix 75 mg oral tablet: 1 tab(s) orally once a day  Prolia 60 mg/mL subcutaneous solution: every 6 months  Senna 8.6 mg oral tablet: 1 tab(s) orally once a day (at bedtime)  Tylenol 325 mg oral capsule: 2 cap(s) orally 2 times a day  Vascepa 1 g oral capsule: 2 cap(s) orally 2 times a day  Zocor 20 mg oral tablet: 1 tab(s) orally once a day (at bedtime)

## 2022-09-03 NOTE — DISCHARGE NOTE PROVIDER - PROVIDER TOKENS
FREE:[LAST:[Song],FIRST:[Ritchie],PHONE:[phone]:[(   )    -],ADDRESS:[[address] [place] [address], [address] [place] [address]],FOLLOWUP:[2 weeks],ESTABLISHEDPATIENT:[T]] FREE:[LAST:[Song],FIRST:[Ritchie],PHONE:[phone]:[(   )    -],ADDRESS:[[address] [place] [address], [address] [place] [address]],FOLLOWUP:[2 weeks],ESTABLISHEDPATIENT:[T]] FREE:[LAST:[Song],FIRST:[Ritchie],PHONE:[phone]],FAX:[(   )    -],ADDRESS:[[address] [place] [address], [address] [place] [address]],FOLLOWUP:[2 weeks],ESTABLISHEDPATIENT:[T]]

## 2022-09-07 LAB
ISTAT ACTK (ACTIVATED CLOTTING TIME KAOLIN): 294 SEC — HIGH (ref 74–137)

## 2022-09-09 DIAGNOSIS — Z95.5 PRESENCE OF CORONARY ANGIOPLASTY IMPLANT AND GRAFT: ICD-10-CM

## 2022-09-09 DIAGNOSIS — Z79.4 LONG TERM (CURRENT) USE OF INSULIN: ICD-10-CM

## 2022-09-09 DIAGNOSIS — Z85.038 PERSONAL HISTORY OF OTHER MALIGNANT NEOPLASM OF LARGE INTESTINE: ICD-10-CM

## 2022-09-09 DIAGNOSIS — Z79.82 LONG TERM (CURRENT) USE OF ASPIRIN: ICD-10-CM

## 2022-09-09 DIAGNOSIS — Z79.84 LONG TERM (CURRENT) USE OF ORAL HYPOGLYCEMIC DRUGS: ICD-10-CM

## 2022-09-09 DIAGNOSIS — I10 ESSENTIAL (PRIMARY) HYPERTENSION: ICD-10-CM

## 2022-09-09 DIAGNOSIS — I25.84 CORONARY ATHEROSCLEROSIS DUE TO CALCIFIED CORONARY LESION: ICD-10-CM

## 2022-09-09 DIAGNOSIS — R93.1 ABNORMAL FINDINGS ON DIAGNOSTIC IMAGING OF HEART AND CORONARY CIRCULATION: ICD-10-CM

## 2022-09-09 DIAGNOSIS — I25.119 ATHEROSCLEROTIC HEART DISEASE OF NATIVE CORONARY ARTERY WITH UNSPECIFIED ANGINA PECTORIS: ICD-10-CM

## 2022-09-09 DIAGNOSIS — Z79.02 LONG TERM (CURRENT) USE OF ANTITHROMBOTICS/ANTIPLATELETS: ICD-10-CM

## 2022-09-09 DIAGNOSIS — Z87.891 PERSONAL HISTORY OF NICOTINE DEPENDENCE: ICD-10-CM

## 2022-09-09 DIAGNOSIS — Z98.49 CATARACT EXTRACTION STATUS, UNSPECIFIED EYE: ICD-10-CM

## 2022-09-09 DIAGNOSIS — E11.65 TYPE 2 DIABETES MELLITUS WITH HYPERGLYCEMIA: ICD-10-CM

## 2022-09-09 DIAGNOSIS — J45.909 UNSPECIFIED ASTHMA, UNCOMPLICATED: ICD-10-CM

## 2022-09-09 DIAGNOSIS — Z92.3 PERSONAL HISTORY OF IRRADIATION: ICD-10-CM

## 2022-09-09 DIAGNOSIS — Z92.21 PERSONAL HISTORY OF ANTINEOPLASTIC CHEMOTHERAPY: ICD-10-CM

## 2022-09-09 DIAGNOSIS — E78.5 HYPERLIPIDEMIA, UNSPECIFIED: ICD-10-CM

## 2023-11-30 DIAGNOSIS — I73.9 PERIPHERAL VASCULAR DISEASE, UNSPECIFIED: ICD-10-CM

## 2023-12-04 ENCOUNTER — APPOINTMENT (OUTPATIENT)
Dept: VASCULAR SURGERY | Facility: HOSPITAL | Age: 78
End: 2023-12-04

## 2023-12-04 ENCOUNTER — OUTPATIENT (OUTPATIENT)
Dept: OUTPATIENT SERVICES | Facility: HOSPITAL | Age: 78
LOS: 1 days | Discharge: HOME | End: 2023-12-04
Payer: MEDICARE

## 2023-12-04 VITALS
RESPIRATION RATE: 17 BRPM | WEIGHT: 180.78 LBS | OXYGEN SATURATION: 97 % | TEMPERATURE: 97 F | HEIGHT: 62 IN | SYSTOLIC BLOOD PRESSURE: 146 MMHG | DIASTOLIC BLOOD PRESSURE: 76 MMHG | HEART RATE: 66 BPM

## 2023-12-04 DIAGNOSIS — Z98.890 OTHER SPECIFIED POSTPROCEDURAL STATES: Chronic | ICD-10-CM

## 2023-12-04 DIAGNOSIS — Z98.49 CATARACT EXTRACTION STATUS, UNSPECIFIED EYE: Chronic | ICD-10-CM

## 2023-12-04 LAB
ALBUMIN SERPL ELPH-MCNC: 4.3 G/DL — SIGNIFICANT CHANGE UP (ref 3.3–5)
ALBUMIN SERPL ELPH-MCNC: 4.3 G/DL — SIGNIFICANT CHANGE UP (ref 3.3–5)
ALP SERPL-CCNC: 82 U/L — SIGNIFICANT CHANGE UP (ref 40–120)
ALP SERPL-CCNC: 82 U/L — SIGNIFICANT CHANGE UP (ref 40–120)
ALT FLD-CCNC: 13 U/L — SIGNIFICANT CHANGE UP (ref 10–45)
ALT FLD-CCNC: 13 U/L — SIGNIFICANT CHANGE UP (ref 10–45)
ANION GAP SERPL CALC-SCNC: 9 MMOL/L — SIGNIFICANT CHANGE UP (ref 5–17)
ANION GAP SERPL CALC-SCNC: 9 MMOL/L — SIGNIFICANT CHANGE UP (ref 5–17)
APTT BLD: 34.6 SEC — SIGNIFICANT CHANGE UP (ref 24.5–35.6)
APTT BLD: 34.6 SEC — SIGNIFICANT CHANGE UP (ref 24.5–35.6)
AST SERPL-CCNC: 17 U/L — SIGNIFICANT CHANGE UP (ref 10–40)
AST SERPL-CCNC: 17 U/L — SIGNIFICANT CHANGE UP (ref 10–40)
BASOPHILS # BLD AUTO: 0.04 K/UL — SIGNIFICANT CHANGE UP (ref 0–0.2)
BASOPHILS # BLD AUTO: 0.04 K/UL — SIGNIFICANT CHANGE UP (ref 0–0.2)
BASOPHILS NFR BLD AUTO: 0.8 % — SIGNIFICANT CHANGE UP (ref 0–2)
BASOPHILS NFR BLD AUTO: 0.8 % — SIGNIFICANT CHANGE UP (ref 0–2)
BILIRUB SERPL-MCNC: 0.5 MG/DL — SIGNIFICANT CHANGE UP (ref 0.2–1.2)
BILIRUB SERPL-MCNC: 0.5 MG/DL — SIGNIFICANT CHANGE UP (ref 0.2–1.2)
BUN SERPL-MCNC: 17 MG/DL — SIGNIFICANT CHANGE UP (ref 7–23)
BUN SERPL-MCNC: 17 MG/DL — SIGNIFICANT CHANGE UP (ref 7–23)
CALCIUM SERPL-MCNC: 10 MG/DL — SIGNIFICANT CHANGE UP (ref 8.4–10.5)
CALCIUM SERPL-MCNC: 10 MG/DL — SIGNIFICANT CHANGE UP (ref 8.4–10.5)
CHLORIDE SERPL-SCNC: 101 MMOL/L — SIGNIFICANT CHANGE UP (ref 96–108)
CHLORIDE SERPL-SCNC: 101 MMOL/L — SIGNIFICANT CHANGE UP (ref 96–108)
CO2 SERPL-SCNC: 31 MMOL/L — SIGNIFICANT CHANGE UP (ref 22–31)
CO2 SERPL-SCNC: 31 MMOL/L — SIGNIFICANT CHANGE UP (ref 22–31)
CREAT SERPL-MCNC: 0.78 MG/DL — SIGNIFICANT CHANGE UP (ref 0.5–1.3)
CREAT SERPL-MCNC: 0.78 MG/DL — SIGNIFICANT CHANGE UP (ref 0.5–1.3)
EGFR: 78 ML/MIN/1.73M2 — SIGNIFICANT CHANGE UP
EGFR: 78 ML/MIN/1.73M2 — SIGNIFICANT CHANGE UP
EOSINOPHIL # BLD AUTO: 0.09 K/UL — SIGNIFICANT CHANGE UP (ref 0–0.5)
EOSINOPHIL # BLD AUTO: 0.09 K/UL — SIGNIFICANT CHANGE UP (ref 0–0.5)
EOSINOPHIL NFR BLD AUTO: 1.7 % — SIGNIFICANT CHANGE UP (ref 0–6)
EOSINOPHIL NFR BLD AUTO: 1.7 % — SIGNIFICANT CHANGE UP (ref 0–6)
GLUCOSE BLDC GLUCOMTR-MCNC: 148 MG/DL — HIGH (ref 70–99)
GLUCOSE BLDC GLUCOMTR-MCNC: 148 MG/DL — HIGH (ref 70–99)
GLUCOSE BLDC GLUCOMTR-MCNC: 203 MG/DL — HIGH (ref 70–99)
GLUCOSE BLDC GLUCOMTR-MCNC: 203 MG/DL — HIGH (ref 70–99)
GLUCOSE SERPL-MCNC: 214 MG/DL — HIGH (ref 70–99)
GLUCOSE SERPL-MCNC: 214 MG/DL — HIGH (ref 70–99)
HCT VFR BLD CALC: 37.9 % — SIGNIFICANT CHANGE UP (ref 34.5–45)
HCT VFR BLD CALC: 37.9 % — SIGNIFICANT CHANGE UP (ref 34.5–45)
HGB BLD-MCNC: 12.1 G/DL — SIGNIFICANT CHANGE UP (ref 11.5–15.5)
HGB BLD-MCNC: 12.1 G/DL — SIGNIFICANT CHANGE UP (ref 11.5–15.5)
IMM GRANULOCYTES NFR BLD AUTO: 0.2 % — SIGNIFICANT CHANGE UP (ref 0–0.9)
IMM GRANULOCYTES NFR BLD AUTO: 0.2 % — SIGNIFICANT CHANGE UP (ref 0–0.9)
INR BLD: 0.91 — SIGNIFICANT CHANGE UP (ref 0.85–1.18)
INR BLD: 0.91 — SIGNIFICANT CHANGE UP (ref 0.85–1.18)
LYMPHOCYTES # BLD AUTO: 1.54 K/UL — SIGNIFICANT CHANGE UP (ref 1–3.3)
LYMPHOCYTES # BLD AUTO: 1.54 K/UL — SIGNIFICANT CHANGE UP (ref 1–3.3)
LYMPHOCYTES # BLD AUTO: 29.1 % — SIGNIFICANT CHANGE UP (ref 13–44)
LYMPHOCYTES # BLD AUTO: 29.1 % — SIGNIFICANT CHANGE UP (ref 13–44)
MCHC RBC-ENTMCNC: 27.6 PG — SIGNIFICANT CHANGE UP (ref 27–34)
MCHC RBC-ENTMCNC: 27.6 PG — SIGNIFICANT CHANGE UP (ref 27–34)
MCHC RBC-ENTMCNC: 31.9 GM/DL — LOW (ref 32–36)
MCHC RBC-ENTMCNC: 31.9 GM/DL — LOW (ref 32–36)
MCV RBC AUTO: 86.5 FL — SIGNIFICANT CHANGE UP (ref 80–100)
MCV RBC AUTO: 86.5 FL — SIGNIFICANT CHANGE UP (ref 80–100)
MONOCYTES # BLD AUTO: 0.33 K/UL — SIGNIFICANT CHANGE UP (ref 0–0.9)
MONOCYTES # BLD AUTO: 0.33 K/UL — SIGNIFICANT CHANGE UP (ref 0–0.9)
MONOCYTES NFR BLD AUTO: 6.2 % — SIGNIFICANT CHANGE UP (ref 2–14)
MONOCYTES NFR BLD AUTO: 6.2 % — SIGNIFICANT CHANGE UP (ref 2–14)
NEUTROPHILS # BLD AUTO: 3.28 K/UL — SIGNIFICANT CHANGE UP (ref 1.8–7.4)
NEUTROPHILS # BLD AUTO: 3.28 K/UL — SIGNIFICANT CHANGE UP (ref 1.8–7.4)
NEUTROPHILS NFR BLD AUTO: 62 % — SIGNIFICANT CHANGE UP (ref 43–77)
NEUTROPHILS NFR BLD AUTO: 62 % — SIGNIFICANT CHANGE UP (ref 43–77)
NRBC # BLD: 0 /100 WBCS — SIGNIFICANT CHANGE UP (ref 0–0)
NRBC # BLD: 0 /100 WBCS — SIGNIFICANT CHANGE UP (ref 0–0)
PLATELET # BLD AUTO: 224 K/UL — SIGNIFICANT CHANGE UP (ref 150–400)
PLATELET # BLD AUTO: 224 K/UL — SIGNIFICANT CHANGE UP (ref 150–400)
POTASSIUM SERPL-MCNC: 4.2 MMOL/L — SIGNIFICANT CHANGE UP (ref 3.5–5.3)
POTASSIUM SERPL-MCNC: 4.2 MMOL/L — SIGNIFICANT CHANGE UP (ref 3.5–5.3)
POTASSIUM SERPL-SCNC: 4.2 MMOL/L — SIGNIFICANT CHANGE UP (ref 3.5–5.3)
POTASSIUM SERPL-SCNC: 4.2 MMOL/L — SIGNIFICANT CHANGE UP (ref 3.5–5.3)
PROT SERPL-MCNC: 7.3 G/DL — SIGNIFICANT CHANGE UP (ref 6–8.3)
PROT SERPL-MCNC: 7.3 G/DL — SIGNIFICANT CHANGE UP (ref 6–8.3)
PROTHROM AB SERPL-ACNC: 10.4 SEC — SIGNIFICANT CHANGE UP (ref 9.5–13)
PROTHROM AB SERPL-ACNC: 10.4 SEC — SIGNIFICANT CHANGE UP (ref 9.5–13)
RBC # BLD: 4.38 M/UL — SIGNIFICANT CHANGE UP (ref 3.8–5.2)
RBC # BLD: 4.38 M/UL — SIGNIFICANT CHANGE UP (ref 3.8–5.2)
RBC # FLD: 13 % — SIGNIFICANT CHANGE UP (ref 10.3–14.5)
RBC # FLD: 13 % — SIGNIFICANT CHANGE UP (ref 10.3–14.5)
SODIUM SERPL-SCNC: 141 MMOL/L — SIGNIFICANT CHANGE UP (ref 135–145)
SODIUM SERPL-SCNC: 141 MMOL/L — SIGNIFICANT CHANGE UP (ref 135–145)
WBC # BLD: 5.29 K/UL — SIGNIFICANT CHANGE UP (ref 3.8–10.5)
WBC # BLD: 5.29 K/UL — SIGNIFICANT CHANGE UP (ref 3.8–10.5)
WBC # FLD AUTO: 5.29 K/UL — SIGNIFICANT CHANGE UP (ref 3.8–10.5)
WBC # FLD AUTO: 5.29 K/UL — SIGNIFICANT CHANGE UP (ref 3.8–10.5)

## 2023-12-04 PROCEDURE — 36245 INS CATH ABD/L-EXT ART 1ST: CPT | Mod: GC

## 2023-12-04 PROCEDURE — 75710 ARTERY X-RAYS ARM/LEG: CPT | Mod: 26,GC

## 2023-12-04 PROCEDURE — C1894: CPT

## 2023-12-04 PROCEDURE — C1887: CPT

## 2023-12-04 PROCEDURE — 76000 FLUOROSCOPY <1 HR PHYS/QHP: CPT

## 2023-12-04 PROCEDURE — C1769: CPT

## 2023-12-04 PROCEDURE — 85610 PROTHROMBIN TIME: CPT

## 2023-12-04 PROCEDURE — 76937 US GUIDE VASCULAR ACCESS: CPT | Mod: 26,GC

## 2023-12-04 PROCEDURE — 80053 COMPREHEN METABOLIC PANEL: CPT

## 2023-12-04 PROCEDURE — 85025 COMPLETE CBC W/AUTO DIFF WBC: CPT

## 2023-12-04 PROCEDURE — 85730 THROMBOPLASTIN TIME PARTIAL: CPT

## 2023-12-04 PROCEDURE — 82962 GLUCOSE BLOOD TEST: CPT

## 2023-12-04 PROCEDURE — 36246 INS CATH ABD/L-EXT ART 2ND: CPT | Mod: RT

## 2023-12-04 PROCEDURE — 36415 COLL VENOUS BLD VENIPUNCTURE: CPT

## 2023-12-04 PROCEDURE — 75625 CONTRAST EXAM ABDOMINL AORTA: CPT | Mod: 26,GC

## 2023-12-04 RX ORDER — GABAPENTIN 400 MG/1
1 CAPSULE ORAL
Qty: 0 | Refills: 0 | DISCHARGE

## 2023-12-04 RX ORDER — INSULIN ASPART 100 [IU]/ML
4 INJECTION, SOLUTION SUBCUTANEOUS
Qty: 0 | Refills: 0 | DISCHARGE

## 2023-12-04 RX ORDER — DEXLANSOPRAZOLE 30 MG/1
1 CAPSULE, DELAYED RELEASE ORAL
Qty: 0 | Refills: 0 | DISCHARGE

## 2023-12-04 RX ORDER — METFORMIN HYDROCHLORIDE 850 MG/1
1 TABLET ORAL
Qty: 0 | Refills: 0 | DISCHARGE

## 2023-12-04 RX ORDER — SIMVASTATIN 20 MG/1
1 TABLET, FILM COATED ORAL
Qty: 0 | Refills: 0 | DISCHARGE

## 2023-12-04 RX ORDER — ISOSORBIDE MONONITRATE 60 MG/1
1 TABLET, EXTENDED RELEASE ORAL
Qty: 0 | Refills: 0 | DISCHARGE

## 2023-12-04 RX ORDER — MELOXICAM 15 MG/1
1 TABLET ORAL
Qty: 0 | Refills: 0 | DISCHARGE

## 2023-12-04 RX ORDER — LORATADINE 10 MG/1
1 TABLET ORAL
Qty: 0 | Refills: 0 | DISCHARGE

## 2023-12-04 RX ORDER — LOSARTAN/HYDROCHLOROTHIAZIDE 100MG-25MG
1 TABLET ORAL
Qty: 0 | Refills: 0 | DISCHARGE

## 2023-12-04 RX ORDER — DENOSUMAB 60 MG/ML
0 INJECTION SUBCUTANEOUS
Qty: 0 | Refills: 0 | DISCHARGE

## 2023-12-04 RX ORDER — ICOSAPENT ETHYL 500 MG/1
2 CAPSULE, LIQUID FILLED ORAL
Qty: 0 | Refills: 0 | DISCHARGE

## 2023-12-04 RX ORDER — LIDOCAINE 4 G/100G
0 CREAM TOPICAL
Qty: 0 | Refills: 0 | DISCHARGE

## 2023-12-04 RX ORDER — DONEPEZIL HYDROCHLORIDE 10 MG/1
1 TABLET, FILM COATED ORAL
Qty: 0 | Refills: 0 | DISCHARGE

## 2023-12-04 RX ORDER — SODIUM CHLORIDE 9 MG/ML
1000 INJECTION INTRAMUSCULAR; INTRAVENOUS; SUBCUTANEOUS
Refills: 0 | Status: DISCONTINUED | OUTPATIENT
Start: 2023-12-04 | End: 2023-12-18

## 2023-12-04 RX ORDER — SENNA PLUS 8.6 MG/1
1 TABLET ORAL
Qty: 0 | Refills: 0 | DISCHARGE

## 2023-12-04 RX ORDER — ACETAMINOPHEN 500 MG
1 TABLET ORAL
Qty: 0 | Refills: 0 | DISCHARGE

## 2023-12-04 RX ORDER — INSULIN DETEMIR 100/ML (3)
40 INSULIN PEN (ML) SUBCUTANEOUS
Qty: 0 | Refills: 0 | DISCHARGE

## 2023-12-04 RX ORDER — LIPASE/PROTEASE/AMYLASE 16-48-48K
1 CAPSULE,DELAYED RELEASE (ENTERIC COATED) ORAL
Qty: 0 | Refills: 0 | DISCHARGE

## 2023-12-04 RX ORDER — NITROGLYCERIN 6.5 MG
1 CAPSULE, EXTENDED RELEASE ORAL
Qty: 0 | Refills: 0 | DISCHARGE

## 2023-12-04 RX ORDER — GLIMEPIRIDE 1 MG
1 TABLET ORAL
Qty: 0 | Refills: 0 | DISCHARGE

## 2023-12-04 RX ORDER — MAGNESIUM OXIDE 400 MG ORAL TABLET 241.3 MG
1 TABLET ORAL
Qty: 0 | Refills: 0 | DISCHARGE

## 2023-12-04 RX ORDER — ASPIRIN/CALCIUM CARB/MAGNESIUM 324 MG
1 TABLET ORAL
Qty: 0 | Refills: 0 | DISCHARGE

## 2023-12-04 RX ORDER — INSULIN ASPART 100 [IU]/ML
40 INJECTION, SOLUTION SUBCUTANEOUS
Qty: 0 | Refills: 0 | DISCHARGE

## 2023-12-04 RX ORDER — HYDROMORPHONE HYDROCHLORIDE 2 MG/ML
0.2 INJECTION INTRAMUSCULAR; INTRAVENOUS; SUBCUTANEOUS
Refills: 0 | Status: DISCONTINUED | OUTPATIENT
Start: 2023-12-04 | End: 2023-12-04

## 2023-12-04 RX ORDER — INSULIN NPH HUM/REG INSULIN HM 70-30/ML
0 VIAL (ML) SUBCUTANEOUS
Qty: 0 | Refills: 0 | DISCHARGE

## 2023-12-04 RX ORDER — CHLORHEXIDINE GLUCONATE 213 G/1000ML
1 SOLUTION TOPICAL ONCE
Refills: 0 | Status: DISCONTINUED | OUTPATIENT
Start: 2023-12-04 | End: 2023-12-18

## 2023-12-04 RX ORDER — CLOPIDOGREL BISULFATE 75 MG/1
1 TABLET, FILM COATED ORAL
Qty: 0 | Refills: 0 | DISCHARGE

## 2023-12-04 RX ORDER — INSULIN DETEMIR 100/ML (3)
0 INSULIN PEN (ML) SUBCUTANEOUS
Qty: 0 | Refills: 0 | DISCHARGE

## 2023-12-04 RX ORDER — OXYBUTYNIN CHLORIDE 5 MG
1 TABLET ORAL
Qty: 0 | Refills: 0 | DISCHARGE

## 2023-12-04 RX ADMIN — SODIUM CHLORIDE 50 MILLILITER(S): 9 INJECTION INTRAMUSCULAR; INTRAVENOUS; SUBCUTANEOUS at 11:23

## 2023-12-04 NOTE — PROGRESS NOTE ADULT - SUBJECTIVE AND OBJECTIVE BOX
Sheath Removal Note    Procedure: diagnostic RLE angiogram, L groin 5 fr sheath for access  Sheath removed, 20 mins of manual pressure applied. Hemostasis achieved. Left groin soft, no hematoma.  Extremities warm and well perfused    Plan:   Strict bedrest with LLE straight  IVF  D/C home after bedrest if left groin soft No

## 2023-12-04 NOTE — BRIEF OPERATIVE NOTE - OPERATION/FINDINGS
Diagnostic angiogram. L CFA access for RLE angiogram. Angiogram shows no significant disease with 2 vessel runoff to the foot via the R DP and RPT. No complications.

## 2023-12-07 PROBLEM — E11.9 TYPE 2 DIABETES MELLITUS WITHOUT COMPLICATIONS: Chronic | Status: ACTIVE | Noted: 2021-09-29

## 2023-12-07 PROBLEM — I25.10 ATHEROSCLEROTIC HEART DISEASE OF NATIVE CORONARY ARTERY WITHOUT ANGINA PECTORIS: Chronic | Status: ACTIVE | Noted: 2021-10-27

## 2023-12-07 PROBLEM — E78.5 HYPERLIPIDEMIA, UNSPECIFIED: Chronic | Status: ACTIVE | Noted: 2021-09-29

## 2023-12-07 PROBLEM — C18.9 MALIGNANT NEOPLASM OF COLON, UNSPECIFIED: Chronic | Status: ACTIVE | Noted: 2021-09-29

## 2023-12-07 PROBLEM — I10 ESSENTIAL (PRIMARY) HYPERTENSION: Chronic | Status: ACTIVE | Noted: 2021-09-29

## 2023-12-08 DIAGNOSIS — Z79.82 LONG TERM (CURRENT) USE OF ASPIRIN: ICD-10-CM

## 2023-12-08 DIAGNOSIS — E78.5 HYPERLIPIDEMIA, UNSPECIFIED: ICD-10-CM

## 2023-12-08 DIAGNOSIS — Z95.5 PRESENCE OF CORONARY ANGIOPLASTY IMPLANT AND GRAFT: ICD-10-CM

## 2023-12-08 DIAGNOSIS — Z79.4 LONG TERM (CURRENT) USE OF INSULIN: ICD-10-CM

## 2023-12-08 DIAGNOSIS — I70.213 ATHEROSCLEROSIS OF NATIVE ARTERIES OF EXTREMITIES WITH INTERMITTENT CLAUDICATION, BILATERAL LEGS: ICD-10-CM

## 2023-12-08 DIAGNOSIS — Q27.32 ARTERIOVENOUS MALFORMATION OF VESSEL OF LOWER LIMB: ICD-10-CM

## 2023-12-08 DIAGNOSIS — I25.10 ATHEROSCLEROTIC HEART DISEASE OF NATIVE CORONARY ARTERY WITHOUT ANGINA PECTORIS: ICD-10-CM

## 2023-12-08 DIAGNOSIS — Z79.84 LONG TERM (CURRENT) USE OF ORAL HYPOGLYCEMIC DRUGS: ICD-10-CM

## 2023-12-08 DIAGNOSIS — I10 ESSENTIAL (PRIMARY) HYPERTENSION: ICD-10-CM

## 2023-12-08 DIAGNOSIS — E11.51 TYPE 2 DIABETES MELLITUS WITH DIABETIC PERIPHERAL ANGIOPATHY WITHOUT GANGRENE: ICD-10-CM

## 2024-01-14 NOTE — DISCHARGE NOTE NURSING/CASE MANAGEMENT/SOCIAL WORK - NSTOBACCONEVERSMOKERY/N_GEN_A
TIA/ISCHEMIC/HEMORRHAGIC STROKE  Ischemic Stroke    YOUR STROKE RISK FACTORS INCLUDE:   It is important that you know your risk factors and that you work with your doctor on treatment and lifestyle changes to lower your risk of having a future stroke.     Personal Stroke Risks: Non-Modifiable: Age over 55 years  Personal Stroke Risks: Modifiable: High blood pressure, Irregular heart beat (Atrial Fib)    MEDICATIONS:  See Discharge Medication List  Take medications as they are scheduled, and talk with your physician if there are problems taking medications.  Keep a list of medications up to date and carry with you at all times.  Other:      VACCINES:  Most Recent Immunizations   Administered Date(s) Administered    COVID Pfizer 12Y+ 09/28/2022    COVID Pfizer 12Y+ (Requires Dilution) 04/07/2021    COVID Pfizer Bivalent 12Y+ 09/28/2022    Influenza Quadrivalent, MDCK, multi-dose (FLUCELVAX) 12/24/2020    Influenza, quadrivalent, MDCK, preserve-free (FLUCELVAX) 10/21/2019    Influenza, seasonal, injectable, trivalent 01/25/2010    Pneumococcal Polysaccharide Vacc (Pneumovax 23) 02/29/2016    TD Adult, Unspecified Formulation 04/03/2009     Follow up with your doctor regarding influenza and/or pneumonia vaccine(s).    ACTIVITY:  Balance activity with rest    SMOKING:  Avoid all tobacco products and second hand smoke.  Smoking Cessation Counseling offered.  [place] Toll Free Quit Line: [phone]    DIET:  Other per physician order    EDUCATION MATERIALS:  Stroke Folder    B. E.  F.A.S.T. is an easy way to remember the signs of stroke.    Call 9-1-1 if:    BALANCE-Sudden loss of coordination or balance    EYES-Sudden change in vision    FACE-Sudden weakness on one side of the face or facial droop    ARM-Sudden arm or leg weakness or numbness    SPEECH-Sudden slurred speech, trouble speaking, trouble understanding speech    TERRIBLE HEADACHE-Sudden onset of a terrible headache    If someone has any of these  symptoms, even if they go away, it is important to call 911 immediately. It is important to make note of the time the symptoms first appeared to receive the best treatment.        What Is Ischemic Stroke?  The brain needs a constant supply of blood to work. During a stroke, blood stops flowing to part of the brain. The affected area is damaged. Its functions are harmed or even lost. Most strokes are caused by a blockage in a blood vessel that supplies the brain. This is an ischemic stroke. They can also occur if a blood vessel in the brain ruptures (hemorrhagic stroke).      The carotids are large arteries that carry blood from the heart to the brain.     From the heart to the brain  The heart is a pump. It sends oxygen-rich blood out through blood vessels called arteries. If an artery between the heart and the brain is blocked, the brain can’t get enough oxygen. Some artery blockages are caused by fatty deposits (plaque). Arteries can also be blocked by blood clots. Some clots form on the plaque. Others can form in the heart--especially in people with atrial fibrillation, an irregular heart rhythm. If a piece of plaque or clot breaks off and enters the bloodstream, it can block flow to the brain and cause a stroke.   How a stroke occurs  Ischemic stroke occurs when an artery that supplies the brain is greatly narrowed or blocked. This can be caused by a buildup of plaque. It can also occur when small pieces of plaque or blood clot (called emboli) break off from the blood vessel or heart into the bloodstream. The emboli flow in the blood until they get stuck in a small blood vessel in the brain.   Healthy arteries. In a healthy artery, the lining of the artery wall is smooth. This lets blood flow freely from the heart to the rest of the body. The brain gets all the blood it needs to function well.   Damaged arteries. High blood pressure, cigarette smoking, high cholesterol, or other problems can roughen artery  walls. This allows plaque to build up in the walls. Blood clots may also form on the plaque. This can narrow the artery and limit blood flow.       Healthy arteries      Damaged arteries     Know the symptoms of a stroke  Weakness. You may feel a sudden weakness, tingling, or a loss of feeling on one side of your face or body including your arm or leg.  Vision problems. You may have sudden double vision or trouble seeing in one or both eyes.  Speech problems. You may have sudden trouble talking, slurred speech, or problems understanding others.  Movement problems. You may have sudden trouble walking, dizziness, a feeling of spinning, a loss of balance, a feeling of falling, or blackouts.  Remember: If you have any of these symptoms, call 911 and your doctor as soon as possible.   F.A.S.T. is an easy way to remember the signs of a stroke. When you see the signs, you will know what you need to call 911 fast.    F.A.S.T. stands for:    F is for face drooping. One side of the face is drooping or numb. When the person smiles, the smile is uneven.  A is for arm weakness. One arm is weak or numb. When the person lifts both arms and the same time, one arm may drift downward.  S is for speech difficulty. You may notice slurred speech or trouble speaking. The person can't repeat a simple sentence correctly when asked.  T is for time to call 911. If someone shows any of these symptoms, even if they go away, call 911 right away. Make note of the time the symptoms first appeared.  [employer] last reviewed this educational content on 1/1/2020 © 2000-2021 [employer]. All rights reserved. This information is not intended as a substitute for professional medical care. Always follow your healthcare professional's instructions.         Yes

## 2024-11-21 NOTE — PATIENT PROFILE ADULT - ARRIVAL FROM
Received request via: Pharmacy    Was the patient seen in the last year in this department? Yes    Does the patient have an active prescription (recently filled or refills available) for medication(s) requested? No    Pharmacy Name: [facility] [place]    Does the patient have correction Plus and need 100-day supply? (This applies to ALL medications) Patient does not have SCP  
Refill sent  
Home